# Patient Record
Sex: FEMALE | Race: WHITE | NOT HISPANIC OR LATINO
[De-identification: names, ages, dates, MRNs, and addresses within clinical notes are randomized per-mention and may not be internally consistent; named-entity substitution may affect disease eponyms.]

---

## 2019-07-09 ENCOUNTER — APPOINTMENT (OUTPATIENT)
Dept: NEUROSURGERY | Facility: HOSPITAL | Age: 39
End: 2019-07-09
Payer: COMMERCIAL

## 2019-07-09 ENCOUNTER — TRANSCRIPTION ENCOUNTER (OUTPATIENT)
Age: 39
End: 2019-07-09

## 2019-07-09 ENCOUNTER — INPATIENT (INPATIENT)
Facility: HOSPITAL | Age: 39
LOS: 0 days | Discharge: HOME | DRG: 27 | End: 2019-07-10
Attending: NEUROLOGICAL SURGERY | Admitting: NEUROLOGICAL SURGERY
Payer: COMMERCIAL

## 2019-07-09 VITALS
RESPIRATION RATE: 16 BRPM | OXYGEN SATURATION: 97 % | HEIGHT: 64 IN | TEMPERATURE: 98 F | SYSTOLIC BLOOD PRESSURE: 135 MMHG | WEIGHT: 130.07 LBS | HEART RATE: 97 BPM | DIASTOLIC BLOOD PRESSURE: 92 MMHG

## 2019-07-09 DIAGNOSIS — Z98.890 OTHER SPECIFIED POSTPROCEDURAL STATES: Chronic | ICD-10-CM

## 2019-07-09 DIAGNOSIS — I67.1 CEREBRAL ANEURYSM, NONRUPTURED: ICD-10-CM

## 2019-07-09 LAB
ANION GAP SERPL CALC-SCNC: 16 MMOL/L — SIGNIFICANT CHANGE UP (ref 5–17)
APTT BLD: 32.4 SEC — SIGNIFICANT CHANGE UP (ref 27.5–36.3)
BLD GP AB SCN SERPL QL: NEGATIVE — SIGNIFICANT CHANGE UP
BLD GP AB SCN SERPL QL: NEGATIVE — SIGNIFICANT CHANGE UP
BUN SERPL-MCNC: 6 MG/DL — LOW (ref 7–23)
CALCIUM SERPL-MCNC: 9 MG/DL — SIGNIFICANT CHANGE UP (ref 8.4–10.5)
CHLORIDE SERPL-SCNC: 100 MMOL/L — SIGNIFICANT CHANGE UP (ref 96–108)
CO2 SERPL-SCNC: 24 MMOL/L — SIGNIFICANT CHANGE UP (ref 22–31)
CREAT SERPL-MCNC: 0.56 MG/DL — SIGNIFICANT CHANGE UP (ref 0.5–1.3)
GLUCOSE SERPL-MCNC: 81 MG/DL — SIGNIFICANT CHANGE UP (ref 70–99)
HCG SERPL-ACNC: <2 MIU/ML — SIGNIFICANT CHANGE UP
HCT VFR BLD CALC: 38.8 % — SIGNIFICANT CHANGE UP (ref 34.5–45)
HGB BLD-MCNC: 13.9 G/DL — SIGNIFICANT CHANGE UP (ref 11.5–15.5)
INR BLD: 1 RATIO — SIGNIFICANT CHANGE UP (ref 0.88–1.16)
MCHC RBC-ENTMCNC: 34.8 PG — HIGH (ref 27–34)
MCHC RBC-ENTMCNC: 35.8 GM/DL — SIGNIFICANT CHANGE UP (ref 32–36)
MCV RBC AUTO: 97.2 FL — SIGNIFICANT CHANGE UP (ref 80–100)
PA ADP PRP-ACNC: 175 PRU — LOW (ref 194–417)
PLATELET # BLD AUTO: 244 K/UL — SIGNIFICANT CHANGE UP (ref 150–400)
POTASSIUM SERPL-MCNC: 3.9 MMOL/L — SIGNIFICANT CHANGE UP (ref 3.5–5.3)
POTASSIUM SERPL-SCNC: 3.9 MMOL/L — SIGNIFICANT CHANGE UP (ref 3.5–5.3)
PROTHROM AB SERPL-ACNC: 11.4 SEC — SIGNIFICANT CHANGE UP (ref 10–12.9)
RBC # BLD: 3.99 M/UL — SIGNIFICANT CHANGE UP (ref 3.8–5.2)
RBC # FLD: 12.4 % — SIGNIFICANT CHANGE UP (ref 10.3–14.5)
RH IG SCN BLD-IMP: POSITIVE — SIGNIFICANT CHANGE UP
SODIUM SERPL-SCNC: 140 MMOL/L — SIGNIFICANT CHANGE UP (ref 135–145)
WBC # BLD: 7.2 K/UL — SIGNIFICANT CHANGE UP (ref 3.8–10.5)
WBC # FLD AUTO: 7.2 K/UL — SIGNIFICANT CHANGE UP (ref 3.8–10.5)

## 2019-07-09 PROCEDURE — 99285 EMERGENCY DEPT VISIT HI MDM: CPT

## 2019-07-09 PROCEDURE — 61624 TCAT PERM OCCLS/EMBOLJ CNS: CPT

## 2019-07-09 PROCEDURE — 99291 CRITICAL CARE FIRST HOUR: CPT

## 2019-07-09 PROCEDURE — 36227 PLACE CATH XTRNL CAROTID: CPT | Mod: 50

## 2019-07-09 PROCEDURE — 36224 PLACE CATH CAROTD ART: CPT | Mod: 50

## 2019-07-09 PROCEDURE — 70496 CT ANGIOGRAPHY HEAD: CPT | Mod: 26

## 2019-07-09 PROCEDURE — 75898 FOLLOW-UP ANGIOGRAPHY: CPT | Mod: 26

## 2019-07-09 PROCEDURE — 75894 X-RAYS TRANSCATH THERAPY: CPT | Mod: 26

## 2019-07-09 PROCEDURE — 36226 PLACE CATH VERTEBRAL ART: CPT | Mod: 50

## 2019-07-09 PROCEDURE — 36228 PLACE CATH INTRACRANIAL ART: CPT | Mod: LT

## 2019-07-09 RX ORDER — ASPIRIN/CALCIUM CARB/MAGNESIUM 324 MG
325 TABLET ORAL DAILY
Refills: 0 | Status: DISCONTINUED | OUTPATIENT
Start: 2019-07-10 | End: 2019-07-10

## 2019-07-09 RX ORDER — CHLORHEXIDINE GLUCONATE 213 G/1000ML
1 SOLUTION TOPICAL
Refills: 0 | Status: DISCONTINUED | OUTPATIENT
Start: 2019-07-09 | End: 2019-07-10

## 2019-07-09 RX ORDER — ASPIRIN/CALCIUM CARB/MAGNESIUM 324 MG
650 TABLET ORAL ONCE
Refills: 0 | Status: COMPLETED | OUTPATIENT
Start: 2019-07-09 | End: 2019-07-09

## 2019-07-09 RX ORDER — CLOPIDOGREL BISULFATE 75 MG/1
600 TABLET, FILM COATED ORAL ONCE
Refills: 0 | Status: COMPLETED | OUTPATIENT
Start: 2019-07-09 | End: 2019-07-09

## 2019-07-09 RX ORDER — SENNA PLUS 8.6 MG/1
2 TABLET ORAL AT BEDTIME
Refills: 0 | Status: DISCONTINUED | OUTPATIENT
Start: 2019-07-09 | End: 2019-07-10

## 2019-07-09 RX ORDER — DEXTROSE MONOHYDRATE, SODIUM CHLORIDE, AND POTASSIUM CHLORIDE 50; .745; 4.5 G/1000ML; G/1000ML; G/1000ML
1000 INJECTION, SOLUTION INTRAVENOUS
Refills: 0 | Status: DISCONTINUED | OUTPATIENT
Start: 2019-07-09 | End: 2019-07-10

## 2019-07-09 RX ORDER — CLOPIDOGREL BISULFATE 75 MG/1
75 TABLET, FILM COATED ORAL DAILY
Refills: 0 | Status: DISCONTINUED | OUTPATIENT
Start: 2019-07-10 | End: 2019-07-10

## 2019-07-09 RX ORDER — DOCUSATE SODIUM 100 MG
100 CAPSULE ORAL THREE TIMES A DAY
Refills: 0 | Status: DISCONTINUED | OUTPATIENT
Start: 2019-07-09 | End: 2019-07-10

## 2019-07-09 RX ADMIN — DEXTROSE MONOHYDRATE, SODIUM CHLORIDE, AND POTASSIUM CHLORIDE 75 MILLILITER(S): 50; .745; 4.5 INJECTION, SOLUTION INTRAVENOUS at 18:13

## 2019-07-09 RX ADMIN — CLOPIDOGREL BISULFATE 600 MILLIGRAM(S): 75 TABLET, FILM COATED ORAL at 18:05

## 2019-07-09 RX ADMIN — CHLORHEXIDINE GLUCONATE 1 APPLICATION(S): 213 SOLUTION TOPICAL at 23:29

## 2019-07-09 RX ADMIN — Medication 650 MILLIGRAM(S): at 18:05

## 2019-07-09 NOTE — ED PROVIDER NOTE - ATTENDING CONTRIBUTION TO CARE
Jayne Rehman MD - Attending Physician: I have personally seen and examined this patient with the resident/fellow.  I have fully participated in the care of this patient. I have reviewed all pertinent clinical information, including history, physical exam, plan and the Resident/Fellow’s note and agree except as noted. See MDM

## 2019-07-09 NOTE — H&P ADULT - ASSESSMENT
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 Exam: Fully intact.  - Load 600mg plavix, 650mg ASA  - Plan for angio / pipeline tonight

## 2019-07-09 NOTE — CHART NOTE - NSCHARTNOTEFT_GEN_A_CORE
Interventional Neuro- Radiology   Procedure Note PA-C    Procedure: Selective Cerebral Angiography and PIpeline placement   Pre- Procedure Diagnosis:  Post- Procedure Diagnosis:    : Dr. Donal Nixon  Fellow:    Dr Lionel Garcia                        Physician Assistant: Sabrina Farah PA-C    Nurse:                    Tamela Murphy RN  Radiologic Tech:   Ryan Vega LRT  Anesthesiologist:  Dr Guille Brian   Sheath:                 4 Maldivian Fubuki     I/Os: EBL less than 10cc  IV fluids:     cc Urine output     cc  Contrast Omnipaque 240      cc         Antibiotics:    Vitals: BP         HR      Spo2       Preliminary Report:  Using a 4 Maldivian Fubuki sheath to the right groin under MAC sedation via   left vertebral artery,  left common carotid artery, left external carotid artery, right vertebral artery  right common carotid artery, right external carotid artery  a selective cerebral angiography was performed and  demonstrated                   Official note to follow).  Patient tolerated procedure well, hemodynamically stable, no change in neurological status compared to baseline.  Results discussed with neurosurgery, patient and patient's  family.  Right groin sheath was removed,  manual compression held to hemostasis  for  21 minutes, no active bleeding, no hematoma, quick clot and safeguard balloon dressing applied at Interventional Neuro- Radiology   Procedure Note PA-C    Procedure: Selective Cerebral Angiography and PIpeline placement   Pre- Procedure Diagnosis:  Post- Procedure Diagnosis:    : Dr. Donal Nixon  Fellow:    Dr Lionel Garcia                        Physician Assistant: Sabrina Farah PA-C    Nurse:                    Tamela Murphy RN  Radiologic Tech:    Matheus Jones LRT  Anesthesiologist:   Dr Guille Brian   Sheath:                 4 Palestinian Fubuki     I/Os: EBL less than 10cc  IV fluids:     cc Urine output     cc  Contrast Omnipaque 240      cc         Antibiotics:    Vitals: BP         HR      Spo2       Preliminary Report:  Using a 4 Palestinian Fubuki sheath to the right groin under MAC sedation via   left vertebral artery,  left common carotid artery, left external carotid artery, right vertebral artery  right common carotid artery, right external carotid artery  a selective cerebral angiography was performed and  demonstrated                   Official note to follow).  Patient tolerated procedure well, hemodynamically stable, no change in neurological status compared to baseline.  Results discussed with neurosurgery, patient and patient's  family.  Right groin sheath was removed,  manual compression held to hemostasis  for  21 minutes, no active bleeding, no hematoma, quick clot and safeguard balloon dressing applied at Interventional Neuro- Radiology   Procedure Note PA-C    Procedure: Selective Cerebral Angiography and PIpeline placement   Pre- Procedure Diagnosis:  Post- Procedure Diagnosis:    : Dr. Donal Nixon  Fellow:    Dr Lionel Garcia                        Physician Assistant: Sabrina Farah PA-C    Nurse:                    Tamela Murphy RN  Radiologic Tech:    Matheus Jones LRT  Anesthesiologist:   Dr Guille Brian   Sheath:                 4 Citizen of [place] Fubuki     I/Os: EBL less than 10cc  IV fluids:     cc Urine output     cc  Contrast Omnipaque 240             Antibiotics: Ancef 2 grams       Nicardipine 3mg left ICA    Vitals: BP         HR      Spo2       Preliminary Report:  Using a 4 Citizen of [place] Fubuki sheath to the right groin under general anesthesia via   left vertebral artery,  left common carotid artery, left external carotid artery, right vertebral artery  right common carotid artery, right external carotid artery  a selective cerebral angiography was performed and  demonstrated                   Official note to follow).  Patient tolerated procedure well, hemodynamically stable, no change in neurological status compared to baseline.  Results discussed with neurosurgery, patient and patient's  family.  Right groin sheath was removed,  manual compression held to hemostasis  for  21 minutes, no active bleeding, no hematoma, quick clot and safeguard balloon dressing applied at Interventional Neuro- Radiology   Procedure Note PA-C    Procedure: Selective Cerebral Angiography and PIpeline placement   Pre- Procedure Diagnosis:  Post- Procedure Diagnosis:    : Dr. Donal Nixon  Fellow:    Dr Lionel Garcia                        Physician Assistant: Sabrina Farah PA-C    Nurse:                    Tamela Murphy RN  Radiologic Tech:    Matheus Jones LRT  Anesthesiologist:   Dr Guille Brian   Sheath:                 4 Georgian Fubuki     I/Os: EBL less than 10cc  IV fluids:     cc Urine output     cc  Contrast Omnipaque 240      baseline            Antibiotics: Ancef 2 grams       Nicardipine 3mg left ICA    Heparin 3,000 units IV push    Vitals: BP         HR      Spo2       Preliminary Report:  Using a 4 Georgian Fubuki sheath to the right groin under general anesthesia via   left vertebral artery,  left common carotid artery, left external carotid artery, right vertebral artery  right common carotid artery, right external carotid artery  a selective cerebral angiography was performed and  demonstrated                   Official note to follow).  Patient tolerated procedure well, hemodynamically stable, no change in neurological status compared to baseline.  Results discussed with neurosurgery, patient and patient's  family.  Right groin sheath was removed,  manual compression held to hemostasis  for  21 minutes, no active bleeding, no hematoma, quick clot and safeguard balloon dressing applied at Interventional Neuro- Radiology   Procedure Note PA-C    Procedure: Selective Cerebral Angiography and PIpeline placement   Pre- Procedure Diagnosis:  Post- Procedure Diagnosis:    : Dr. Donal Nixon  Fellow:    Dr Lionel Garcia                        Physician Assistant: Sabrina Farah PA-C    Nurse:                    Tamela Murphy RN  Chelsie Beth RN  Radiologic Tech:    Matheus Jones LRT  Anesthesiologist:   Dr Guille Brian   Sheath:                 4 Armenian Fubuki     I/Os: EBL less than 10cc  IV fluids:     cc Urine output     cc  Contrast Omnipaque 240      baseline            Antibiotics: Ancef 2 grams       Nicardipine 3mg left ICA    Heparin 3,000 units IV push    Vitals: BP         HR      Spo2       Preliminary Report:  Using a 4 Armenian Fubuki sheath to the right groin under general anesthesia via left vertebral artery, left internal carotid artery, left external carotid artery, right vertebral artery, right internal carotid artery, right external carotid artery a selective cerebral angiography was performed and  demonstrated                   Official note to follow.  Patient tolerated procedure well, hemodynamically stable, no change in neurological status compared to baseline. Results discussed with neuro ICU team, patient and patient's . Right groin sheath was removed, manual compression held to hemostasis for 20 minutes, no active bleeding, no hematoma, quick clot and safeguard balloon dressing applied at Interventional Neuro- Radiology   Procedure Note PA-C    Procedure: Selective Cerebral Angiography and PIpeline placement X 2  Pre- Procedure Diagnosis: left ICA aneurysm   Post- Procedure Diagnosis:    : Dr. Donal Nixon  Fellow:    Dr Lionel Garcia                        Physician Assistant: Sabrina Farah PA-C    Nurse:                    Tamela Beth RN  Radiologic Tech:    Mahteus Jones LRT  Anesthesiologist:    Dr Guille Brian   Sheath:                  4 Cypriot Fubuki     I/Os: EBL less than 10cc  IV fluids:     cc Urine output     cc  Contrast Omnipaque 240      baseline     repeat ACT       Antibiotics: Ancef 2 grams       Nicardipine 3mg left ICA    Heparin 3,000 units IV push    Vitals: BP         HR      Spo2       Preliminary Report:  Using a 4 Cypriot Fubuki sheath to the right groin under general anesthesia via left vertebral artery, left internal carotid artery, left external carotid artery, right vertebral artery, right internal carotid artery, right external carotid artery a selective cerebral angiography was performed and  demonstrated                   Official note to follow.  Patient tolerated procedure well, hemodynamically stable, no change in neurological status compared to baseline. Results discussed with neuro ICU team, patient and patient's . Right groin sheath was removed, manual compression held to hemostasis for 20 minutes, no active bleeding, no hematoma, quick clot and safeguard balloon dressing applied at Interventional Neuro- Radiology   Procedure Note PA-C    Procedure: Selective Cerebral Angiography and PIpeline placement X 2  Pre- Procedure Diagnosis: left ICA aneurysm   Post- Procedure Diagnosis:    : Dr. Donal Nixon  Fellow:    Dr Lionel Garcia                        Physician Assistant: Sabrina Farah PA-C    Nurse:                    Tamela Beth RN  Radiologic Tech:    Matheus Jones LRT  Anesthesiologist:    Dr Guille Brian   Sheath:                  4 Jordanian Fubuki     I/Os: EBL less than 10cc  IV fluids:     cc Urine output     cc  Contrast Omnipaque 240      baseline     repeat     Integrelin 7cc        Antibiotics: Ancef 2 grams       Nicardipine 3mg left ICA    Heparin 3,000 units IV push    Vitals: BP 91/59   HR  63    Spo2 98%       Preliminary Report:  Using a 4 Jordanian Fubuki sheath to the right groin under general anesthesia via left vertebral artery, left internal carotid artery, left external carotid artery, right vertebral artery, right internal carotid artery, right external carotid artery a selective cerebral angiography was performed and  demonstrated                   Official note to follow.  Patient tolerated procedure well, hemodynamically stable, no change in neurological status compared to baseline. Results discussed with neuro ICU team, patient and patient's . Right groin sheath was removed, manual compression held to hemostasis for 20 minutes, no active bleeding, no hematoma, quick clot and safeguard balloon dressing applied at Interventional Neuro- Radiology   Procedure Note PA-C    Procedure: Selective Cerebral Angiography and PIpeline placement X 2  Pre- Procedure Diagnosis: left ICA aneurysm   Post- Procedure Diagnosis:    : Dr. Donal Nixon  Fellow:    Dr Lionel Garcia                        Physician Assistant: Sabrina Farah PA-C    Nurse:                    Tamela Beth RN  Radiologic Tech:    Matheus Jones LRT  Anesthesiologist:    Dr Guille Gimenez   Sheath:                  4 [place] Leonean Fubuki     I/Os: EBL less than 10cc  IV fluids:     cc Urine output     cc  Contrast Omnipaque 240      baseline     repeat     Integrelin 7cc        Antibiotics: Ancef 2 grams       Nicardipine 3mg left ICA    Heparin 3,000 units IV push    Vitals: BP 91/59   HR  63    Spo2 98%       Preliminary Report:  Using a 4 [place] Leonean Fubuki sheath to the right groin under general anesthesia via left vertebral artery, left internal carotid artery, left external carotid artery, right vertebral artery, right internal carotid artery, right external carotid artery a selective cerebral angiography was performed and  demonstrated                   Official note to follow.  Patient tolerated procedure well, hemodynamically stable, no change in neurological status compared to baseline. Results discussed with neuro ICU team, patient and patient's . Right groin sheath was removed, manual compression held to hemostasis for 20 minutes, no active bleeding, no hematoma, quick clot and safeguard balloon dressing applied at Interventional Neuro- Radiology   Procedure Note PA-C    Procedure: Selective Cerebral Angiography and PIpeline placement X 2  Pre- Procedure Diagnosis: left ICA aneurysm   Post- Procedure Diagnosis:    : Dr. Donal Nixon  Fellow:    Dr Lionel Garcia                        Physician Assistant: IJEOMA Nowak-C    Nurse:                    Tamela Beth RN  Radiologic Tech:    Matheus Jones LRT  Anesthesiologist:    Dr Guille Gimenez   Sheath:                  4 [place]orese Fubuki     I/Os: EBL less than 10cc  IV fluids:     cc Urine output     cc  Contrast Omnipaque 240      baseline     repeat     Integrelin 7cc     !st pipeline stent 4.25mm by 12mm     2nd pipeline stent 4.0mm by 10mm       Antibiotics: Ancef 2 grams       Nicardipine 3mg left ICA    Heparin 3,000 units IV push    Vitals: BP 91/59   HR  63    Spo2 98%       Preliminary Report:  Using a 4 [place]orese Fubuki sheath to the right groin under general anesthesia via left vertebral artery, left internal carotid artery, left external carotid artery, right vertebral artery, right internal carotid artery, right external carotid artery a selective cerebral angiography was performed and  demonstrated                   Official note to follow.  Patient tolerated procedure well, hemodynamically stable, no change in neurological status compared to baseline. Results discussed with neuro ICU team, patient and patient's . Right groin sheath was removed, manual compression held to hemostasis for 20 minutes, no active bleeding, no hematoma, quick clot and safeguard balloon dressing applied at Interventional Neuro- Radiology   Procedure Note PA-C    Procedure: Selective Cerebral Angiography and PIpeline placement X 2  Pre- Procedure Diagnosis: left ICA aneurysm   Post- Procedure Diagnosis: complete occlusion of left ICA aneurysm     : Dr. Donal Nixon  Fellow:    Dr Lionel Garcia                        Physician Assistant: Sabrina Farah, PA-C    Nurse:                    Tamela Murphy RN  Chelsie Beth RN  Radiologic Tech:    Matheus Jones LRT  Anesthesiologist:    Dr Guille Gimenez   Sheath:                  4 Nigerian Fub[employer]     I/Os: EBL less than 10cc  IV fluids:500cc Urine output 100 cc  Contrast Omnipaque 240 155cc     baseline     2nd         Integrelin 10cc     !st pipeline stent 4.25mm by 12mm     2nd pipeline stent 4.0mm by 10mm       Antibiotics: Ancef 2 grams       Nicardipine 3mg left ICA    Heparin 3,000 units IV push    Vitals: BP 91/59   HR  63    Spo2 98%       Preliminary Report:  Using a 4 Nigerian Fubuki sheath to the right groin under general anesthesia via left vertebral artery, left internal carotid artery, left external carotid artery, right vertebral artery, right internal carotid artery, right external carotid artery a selective cerebral angiography was performed and  demonstrated a left ICA aneurysm. Patient underwent successful endovascular treatment with 2 pipeline stents. Official note to follow.  Patient tolerated procedure well, hemodynamically stable, no change in neurological status compared to baseline. Results discussed with neuro ICU team, patient and patient's . Right groin sheath was removed, manual compression held to hemostasis for 20 minutes, no active bleeding, no hematoma, quick clot and safeguard balloon dressing applied at 2100 hours.

## 2019-07-09 NOTE — ED ADULT NURSE NOTE - NSIMPLEMENTINTERV_GEN_ALL_ED
Implemented All Universal Safety Interventions:  [place] to call system. Call bell, personal items and telephone within reach. Instruct patient to call for assistance. Room bathroom lighting operational. Non-slip footwear when patient is off stretcher. Physically safe environment: no spills, clutter or unnecessary equipment. Stretcher in lowest position, wheels locked, appropriate side rails in place.
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
## 2019-07-09 NOTE — ED PROVIDER NOTE - PHYSICAL EXAMINATION
General: Alert and Oriented. No apparent distress.  Head: Normocephalic and atraumatic.  Eyes: PERRLA with EOMI.  Neck: Supple. Trachea midline.   Cardiac: Normal S1 and S2 w/ RRR. No murmurs appreciated.   Pulmonary: Vesicular breath sounds bilaterally. No increased WOB. No wheezes or crackles.  Abdominal: Soft, non-tender. Normoactive bowel sounds.  Neurologic: A&O x3. CN 2-12 intact. Strength 5/5 throughout b/l UE and LE. Sensation intact to light touch throughout b/l UE and LE. Finger to nose intact. Gait wnl.   Musculoskeletal: Strength appropriate in all 4 extremities for age with no limited ROM.  Skin: Color appropriate for race. Intact, warm, and well-perfused.

## 2019-07-09 NOTE — ED PROVIDER NOTE - CHPI ED SYMPTOMS NEG
no numbness/no vomiting/no CP, no SOB, no visual changes, no lightheadedness, no dizziness, no tingling/no weakness/no nausea

## 2019-07-09 NOTE — CHART NOTE - NSCHARTNOTEFT_GEN_A_CORE
Interventional Neuro Radiology  Pre-Procedure Note PA-C    This is a 39 year old right hand dominant female            Allergies: No Known Allergies  PMHX:   PSHX:  Social History:   FAMILY HISTORY:    Current Medications: aspirin 650 milliGRAM(s) Oral once  clopidogrel Tablet 600 milliGRAM(s) Oral Once  sodium chloride 0.9% with potassium chloride 20 mEq/L 1000 milliLiter(s) IV                         Blood Bank:       Assessment/Plan:   This is a 39 year old right hand dominant female with a left ICA aneurysm for selective cerebral angiography and endovascular treatment of aneurysm. Procedure, goals, risks, benefits and alternatives were discussed with patient and patient's family. All questions were answered. Risks include but are not limited to stroke, vessel injury, hemorrhage, and or right groin hematoma. Patient demonstrates understanding of all risks involved with this procedure and wishes to continue. Appropriate content was obtained from patient and consent is in the patient's chart. Interventional Neuro Radiology  Pre-Procedure Note PA-C    This is a 39 year old right hand dominant female with a past medical history significant for ovarian cyst removal, with complaints of headaches and right ear fullness, with outside imaging which revealed a left ICA aneurysm. Patient is transported to Neuro IR for a selective cerebral angiography and possible embolization of aneurysm.     Allergies: No Known Allergies  PMHX: ovarian cyst   PSHX: ovarian cyst removal   Social History: , +tobacco   FAMILY HISTORY: non-contributory     Current Medications: aspirin 650 milliGRAM(s) Oral once  clopidogrel Tablet 600 milliGRAM(s) Oral Once  sodium chloride 0.9% with potassium chloride 20 mEq/L 1000 milliLiter(s) IV     Complete Blood Count (07.09.19 )    WBC Count: 7.2 K/uL    Hemoglobin: 13.9 g/dL    Hematocrit: 38.8 %    Platelet Count - Automated: 244 K/uL    Basic Metabolic Panel - STAT (07.09.19 )    Sodium, Serum: 140 mmol/L    Potassium, Serum: 3.9 mmol/L    Chloride, Serum: 100 mmol/L    Carbon Dioxide, Serum: 24 mmol/L    Anion Gap, Serum: 16 mmol/L    Blood Urea Nitrogen, Serum: 6 mg/dL    Creatinine, Serum: 0.56 mg/dL    Glucose, Serum: 81 mg/dL    Blood Bank:  A positive available (07.09.19 )    Assessment/Plan:   This is a 39 year old right hand dominant female with a left ICA aneurysm for selective cerebral angiography and endovascular treatment of aneurysm. Procedure, goals, risks, benefits and alternatives were discussed with patient and patient's family. All questions were answered. Risks include but are not limited to stroke, vessel injury, hemorrhage, and or right groin hematoma. Patient demonstrates understanding of all risks involved with this procedure and wishes to continue. Appropriate content was obtained from patient and consent is in the patient's chart.
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
## 2019-07-10 ENCOUNTER — TRANSCRIPTION ENCOUNTER (OUTPATIENT)
Age: 39
End: 2019-07-10

## 2019-07-10 VITALS
TEMPERATURE: 98 F | SYSTOLIC BLOOD PRESSURE: 101 MMHG | OXYGEN SATURATION: 99 % | RESPIRATION RATE: 21 BRPM | DIASTOLIC BLOOD PRESSURE: 66 MMHG | HEART RATE: 62 BPM

## 2019-07-10 PROBLEM — Z00.00 ENCOUNTER FOR PREVENTIVE HEALTH EXAMINATION: Status: ACTIVE | Noted: 2019-07-10

## 2019-07-10 LAB
ANION GAP SERPL CALC-SCNC: 11 MMOL/L — SIGNIFICANT CHANGE UP (ref 5–17)
BUN SERPL-MCNC: 7 MG/DL — SIGNIFICANT CHANGE UP (ref 7–23)
CALCIUM SERPL-MCNC: 7.8 MG/DL — LOW (ref 8.4–10.5)
CHLORIDE SERPL-SCNC: 102 MMOL/L — SIGNIFICANT CHANGE UP (ref 96–108)
CO2 SERPL-SCNC: 23 MMOL/L — SIGNIFICANT CHANGE UP (ref 22–31)
CREAT SERPL-MCNC: 0.62 MG/DL — SIGNIFICANT CHANGE UP (ref 0.5–1.3)
GLUCOSE SERPL-MCNC: 86 MG/DL — SIGNIFICANT CHANGE UP (ref 70–99)
HCT VFR BLD CALC: 33.5 % — LOW (ref 34.5–45)
HGB BLD-MCNC: 12.1 G/DL — SIGNIFICANT CHANGE UP (ref 11.5–15.5)
MAGNESIUM SERPL-MCNC: 1.7 MG/DL — SIGNIFICANT CHANGE UP (ref 1.6–2.6)
MCHC RBC-ENTMCNC: 34.4 PG — HIGH (ref 27–34)
MCHC RBC-ENTMCNC: 35.9 GM/DL — SIGNIFICANT CHANGE UP (ref 32–36)
MCV RBC AUTO: 95.6 FL — SIGNIFICANT CHANGE UP (ref 80–100)
PA ADP PRP-ACNC: 78 PRU — LOW (ref 194–417)
PA ADP PRP-ACNC: 88 PRU — LOW (ref 194–417)
PHOSPHATE SERPL-MCNC: 4.1 MG/DL — SIGNIFICANT CHANGE UP (ref 2.5–4.5)
PLATELET # BLD AUTO: 243 K/UL — SIGNIFICANT CHANGE UP (ref 150–400)
POTASSIUM SERPL-MCNC: 3.5 MMOL/L — SIGNIFICANT CHANGE UP (ref 3.5–5.3)
POTASSIUM SERPL-SCNC: 3.5 MMOL/L — SIGNIFICANT CHANGE UP (ref 3.5–5.3)
RBC # BLD: 3.51 M/UL — LOW (ref 3.8–5.2)
RBC # FLD: 12.3 % — SIGNIFICANT CHANGE UP (ref 10.3–14.5)
SODIUM SERPL-SCNC: 136 MMOL/L — SIGNIFICANT CHANGE UP (ref 135–145)
WBC # BLD: 7.6 K/UL — SIGNIFICANT CHANGE UP (ref 3.8–10.5)
WBC # FLD AUTO: 7.6 K/UL — SIGNIFICANT CHANGE UP (ref 3.8–10.5)

## 2019-07-10 PROCEDURE — C1876: CPT

## 2019-07-10 PROCEDURE — 80061 LIPID PANEL: CPT

## 2019-07-10 PROCEDURE — 97161 PT EVAL LOW COMPLEX 20 MIN: CPT

## 2019-07-10 PROCEDURE — 36227 PLACE CATH XTRNL CAROTID: CPT

## 2019-07-10 PROCEDURE — 36224 PLACE CATH CAROTD ART: CPT

## 2019-07-10 PROCEDURE — C1894: CPT

## 2019-07-10 PROCEDURE — 83036 HEMOGLOBIN GLYCOSYLATED A1C: CPT

## 2019-07-10 PROCEDURE — 85610 PROTHROMBIN TIME: CPT

## 2019-07-10 PROCEDURE — 85027 COMPLETE CBC AUTOMATED: CPT

## 2019-07-10 PROCEDURE — 84702 CHORIONIC GONADOTROPIN TEST: CPT

## 2019-07-10 PROCEDURE — C1760: CPT

## 2019-07-10 PROCEDURE — 97165 OT EVAL LOW COMPLEX 30 MIN: CPT

## 2019-07-10 PROCEDURE — 76380 CAT SCAN FOLLOW-UP STUDY: CPT

## 2019-07-10 PROCEDURE — 36228 PLACE CATH INTRACRANIAL ART: CPT

## 2019-07-10 PROCEDURE — 84100 ASSAY OF PHOSPHORUS: CPT

## 2019-07-10 PROCEDURE — 70544 MR ANGIOGRAPHY HEAD W/O DYE: CPT | Mod: 26,59

## 2019-07-10 PROCEDURE — 83735 ASSAY OF MAGNESIUM: CPT

## 2019-07-10 PROCEDURE — 86850 RBC ANTIBODY SCREEN: CPT

## 2019-07-10 PROCEDURE — 86900 BLOOD TYPING SEROLOGIC ABO: CPT

## 2019-07-10 PROCEDURE — 61624 TCAT PERM OCCLS/EMBOLJ CNS: CPT

## 2019-07-10 PROCEDURE — 86901 BLOOD TYPING SEROLOGIC RH(D): CPT

## 2019-07-10 PROCEDURE — 85576 BLOOD PLATELET AGGREGATION: CPT

## 2019-07-10 PROCEDURE — 70553 MRI BRAIN STEM W/O & W/DYE: CPT

## 2019-07-10 PROCEDURE — C1887: CPT

## 2019-07-10 PROCEDURE — 70546 MR ANGIOGRAPH HEAD W/O&W/DYE: CPT

## 2019-07-10 PROCEDURE — 80048 BASIC METABOLIC PNL TOTAL CA: CPT

## 2019-07-10 PROCEDURE — 99285 EMERGENCY DEPT VISIT HI MDM: CPT

## 2019-07-10 PROCEDURE — 70551 MRI BRAIN STEM W/O DYE: CPT | Mod: 26

## 2019-07-10 PROCEDURE — 85730 THROMBOPLASTIN TIME PARTIAL: CPT

## 2019-07-10 PROCEDURE — 75894 X-RAYS TRANSCATH THERAPY: CPT

## 2019-07-10 PROCEDURE — 36226 PLACE CATH VERTEBRAL ART: CPT

## 2019-07-10 PROCEDURE — 75898 FOLLOW-UP ANGIOGRAPHY: CPT

## 2019-07-10 PROCEDURE — C1889: CPT

## 2019-07-10 PROCEDURE — C1769: CPT

## 2019-07-10 RX ORDER — SODIUM CHLORIDE 9 MG/ML
250 INJECTION INTRAMUSCULAR; INTRAVENOUS; SUBCUTANEOUS ONCE
Refills: 0 | Status: COMPLETED | OUTPATIENT
Start: 2019-07-10 | End: 2019-07-10

## 2019-07-10 RX ORDER — ASPIRIN/CALCIUM CARB/MAGNESIUM 324 MG
325 TABLET ORAL DAILY
Refills: 0 | Status: DISCONTINUED | OUTPATIENT
Start: 2019-07-10 | End: 2019-07-10

## 2019-07-10 RX ORDER — CLOPIDOGREL BISULFATE 75 MG/1
75 TABLET, FILM COATED ORAL DAILY
Refills: 0 | Status: DISCONTINUED | OUTPATIENT
Start: 2019-07-10 | End: 2019-07-10

## 2019-07-10 RX ORDER — CHLORHEXIDINE GLUCONATE 213 G/1000ML
1 SOLUTION TOPICAL
Refills: 0 | Status: DISCONTINUED | OUTPATIENT
Start: 2019-07-10 | End: 2019-07-10

## 2019-07-10 RX ORDER — CLOPIDOGREL BISULFATE 75 MG/1
1 TABLET, FILM COATED ORAL
Qty: 30 | Refills: 0
Start: 2019-07-10 | End: 2019-08-08

## 2019-07-10 RX ORDER — ASPIRIN/CALCIUM CARB/MAGNESIUM 324 MG
1 TABLET ORAL
Qty: 30 | Refills: 0
Start: 2019-07-10 | End: 2019-08-08

## 2019-07-10 RX ORDER — ACETAMINOPHEN 500 MG
650 TABLET ORAL EVERY 6 HOURS
Refills: 0 | Status: DISCONTINUED | OUTPATIENT
Start: 2019-07-10 | End: 2019-07-10

## 2019-07-10 RX ORDER — ACETAMINOPHEN 500 MG
2 TABLET ORAL
Qty: 0 | Refills: 0 | DISCHARGE
Start: 2019-07-10

## 2019-07-10 RX ADMIN — CLOPIDOGREL BISULFATE 75 MILLIGRAM(S): 75 TABLET, FILM COATED ORAL at 11:30

## 2019-07-10 RX ADMIN — Medication 650 MILLIGRAM(S): at 08:20

## 2019-07-10 RX ADMIN — Medication 325 MILLIGRAM(S): at 11:30

## 2019-07-10 RX ADMIN — DEXTROSE MONOHYDRATE, SODIUM CHLORIDE, AND POTASSIUM CHLORIDE 75 MILLILITER(S): 50; .745; 4.5 INJECTION, SOLUTION INTRAVENOUS at 07:00

## 2019-07-10 RX ADMIN — Medication 650 MILLIGRAM(S): at 08:50

## 2019-07-10 RX ADMIN — Medication 100 MILLIGRAM(S): at 13:58

## 2019-07-10 RX ADMIN — DEXTROSE MONOHYDRATE, SODIUM CHLORIDE, AND POTASSIUM CHLORIDE 75 MILLILITER(S): 50; .745; 4.5 INJECTION, SOLUTION INTRAVENOUS at 09:06

## 2019-07-10 RX ADMIN — SODIUM CHLORIDE 1000 MILLILITER(S): 9 INJECTION INTRAMUSCULAR; INTRAVENOUS; SUBCUTANEOUS at 03:00
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
## 2019-07-10 NOTE — CHART NOTE - NSCHARTNOTEFT_GEN_A_CORE
CAPRINI SCORE [CLOT] Score on Admission for     AGE RELATED RISK FACTORS                                                       MOBILITY RELATED FACTORS  [x] Age 41-60 years                                            (1 Point)                  [ ] Bed rest                                                        (1 Point)  [ ] Age: 61-74 years                                           (2 Points)                 [ ] Plaster cast                                                   (2 Points)  [ ] Age= 75 years                                              (3 Points)                 [ ] Bed bound for more than 72 hours                 (2 Points)    DISEASE RELATED RISK FACTORS                                               GENDER SPECIFIC FACTORS  [ ] Edema in the lower extremities                       (1 Point)                  [ ] Pregnancy                                                     (1 Point)  [ ] Varicose veins                                               (1 Point)                  [ ] Post-partum < 6 weeks                                   (1 Point)             [ ] BMI > 25 Kg/m2                                            (1 Point)                  [ ] Hormonal therapy  or oral contraception          (1 Point)                 [ ] Sepsis (in the previous month)                        (1 Point)                  [ ] History of pregnancy complications                 (1 point)  [ ] Pneumonia or serious lung disease                                               [ ] Unexplained or recurrent                     (1 Point)           (in the previous month)                               (1 Point)  [ ] Abnormal pulmonary function test                     (1 Point)                 SURGERY RELATED RISK FACTORS (include planned surgeries)  [ ] Acute myocardial infarction                              (1 Point)                 [ ]  Section                                             (1 Point)  [ ] Congestive heart failure (in the previous month)  (1 Point)         [ ] Minor surgery                                                  (1 Point)   [ ] Inflammatory bowel disease                             (1 Point)                 [ ] Arthroscopic surgery                                        (2 Points)  [ ] Central venous access                                      (2 Points)                [ ] General surgery lasting more than 45 minutes   (2 Points)       [ ] Stroke (in the previous month)                          (5 Points)               [ ] Elective arthroplasty                                         (5 Points)            [ ] current or past malignancy                              (2 Points)                                                                                                       HEMATOLOGY RELATED FACTORS                                                 TRAUMA RELATED RISK FACTORS  [ ] Prior episodes of VTE                                     (3 Points)                [ ] Fracture of the hip, pelvis, or leg                       (5 Points)  [ ] Positive family history for VTE                         (3 Points)                 [ ] Acute spinal cord injury (in the previous month)  (5 Points)  [ ] Prothrombin 35458 A                                     (3 Points)                 [ ] Paralysis  (less than 1 month)                             (5 Points)  [ ] Factor V Leiden                                             (3 Points)                  [ ] Multiple Trauma within 1 month                        (5 Points)  [ ] Lupus anticoagulants                                     (3 Points)                                                           [ ] Anticardiolipin antibodies                               (3 Points)                                                       [ ] High homocysteine in the blood                      (3 Points)                                             [ ] Other congenital or acquired thrombophilia      (3 Points)                                                [ ] Heparin induced thrombocytopenia                  (3 Points)                                          Total Score [       1  ]    Risk:  Very low 0   Low 1 to 2   Moderate 3 to 4   High =5       VTE Prophylasix Recommednations:  [\x] mechanical pneumatic compression devices                                      [ ] contraindicated: _____________________  [x] chemo prophylasix                                                                                   [ ] contraindicated _____________________    **** HIGH LIKELIHOOD DVT PRESENT ON ADMISSION  [ ] (please order LE dopplers within 24 hours of admission)
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
## 2019-07-10 NOTE — DISCHARGE NOTE PROVIDER - REASON FOR ADMISSION
Elective pipeline stent placement for unruptured left superior hypophyseal aneurysm on 7/9/19 Outside referral Cerebral Angiogram with stent placement for unruptured cerebral aneurysm 7/9/19 cerebral angiogram with two pipeline stent placement of a left superior hypophyseal aneurysm

## 2019-07-10 NOTE — OCCUPATIONAL THERAPY INITIAL EVALUATION ADULT - LIVES WITH, PROFILE
Pt lives in a pvt home with spouse and twin 8 yo, 4 steps to enter, 6 steps inside. +Stall. No DME/AD/spouse/children

## 2019-07-10 NOTE — OCCUPATIONAL THERAPY INITIAL EVALUATION ADULT - DIAGNOSIS, OT EVAL
Patient with deficits in ADL status and functional mobility due to impaired balance, strength, ROM, coordination

## 2019-07-10 NOTE — OCCUPATIONAL THERAPY INITIAL EVALUATION ADULT - GENERAL OBSERVATIONS, REHAB EVAL
1.  Ocular HTN OU:  Elevated intraocular pressure in past (Tmax 20 mmHg OD 21 OSmmHG) without signs of glaucomatous damage to the optic nerve. Normal OCT today. Will continue to monitor. 2. PVD OU:  Patient was cautioned to call our office immediately if they experience a substantial change in their symptoms such as an increase in floaters persistent flashes loss of visual field (may appear as a shadow or a curtain) or decrease in visual acuity as these may indicate a retinal tear or detachment. If this is a new problem patient will need to return for re-examination  as determined by the 2050 [employer]. Refractive error - Change glasses. 4.  Nuclear Sclerotic Cataract OU: Explained how cataracts can effect vision. Recommend clinical observation. The patient was advised to contact us if any change or worsening of vision. 5. Return for an appointment in 3 months for cataract evaluation. with Dr. Boo Martini. Semi-supine, +IVL, tele, pulse ox

## 2019-07-10 NOTE — DISCHARGE NOTE PROVIDER - NSDCCPTREATMENT_GEN_ALL_CORE_FT
PRINCIPAL PROCEDURE  Procedure: Angiogram, cerebral, with stent insertion  Findings and Treatment: Please make an appointment for follow up with neurosurgeon Dr. Trevino in 1-2 weeks. [phone]  NO heavy lifting, strenuous activity, twisting, bending, driving, or working until cleared by your physician.  Return to ER immediately for any of the following: fever, bleeding, new onset numbness/tingling/weakness, nausea and/or vomiting, chest pain, shortness of breath, confusion, seizure, altered mental status, urinary and/or fecal incontinence or retention.  Please make an appointment for follow up with your primary care physician after discharge. PRINCIPAL PROCEDURE  Procedure: Angiogram, cerebral, with stent insertion  Findings and Treatment: Please follow up with Dr. Trevino in 7/24/19 at 11:30 am with Astrid. To make any changes, please call [phone].   NO heavy lifting, strenuous activity, twisting, bending, driving, or working until cleared by your physician.  Return to ER immediately for any of the following: fever, bleeding, new onset numbness/tingling/weakness, nausea and/or vomiting, chest pain, shortness of breath, confusion, seizure, altered mental status, urinary and/or fecal incontinence or retention.  Please make an appointment for follow up with your primary care physician after discharge.

## 2019-07-10 NOTE — DISCHARGE NOTE NURSING/CASE MANAGEMENT/SOCIAL WORK - NSDCPEEMAIL_GEN_ALL_CORE
[facility] for Tobacco Control email [email or web address]

## 2019-07-10 NOTE — DISCHARGE NOTE NURSING/CASE MANAGEMENT/SOCIAL WORK - NSDCPEWEB_GEN_ALL_CORE
[facility] for Tobacco Control website --- [email or web address] website --- [email or web address]

## 2019-07-10 NOTE — PHYSICAL THERAPY INITIAL EVALUATION ADULT - GENERAL OBSERVATIONS, REHAB EVAL
Pt rec'd semi-supine in bed in NAD, VSS, +tele monitoring, +pulse ox, +spoue at b/s, agreeable to PT session
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
## 2019-07-10 NOTE — DISCHARGE NOTE PROVIDER - CARE PROVIDER_API CALL
Donal Trevino)  Neurological Surgery  [address] [place] [address]  Phone: [phone]  Fax: [phone]  Follow Up Time:

## 2019-07-10 NOTE — DISCHARGE NOTE PROVIDER - NSDCCPCAREPLAN_GEN_ALL_CORE_FT
PRINCIPAL DISCHARGE DIAGNOSIS  Diagnosis: Cerebral aneurysm, nonruptured  Assessment and Plan of Treatment: S/P pipeline stent x2 of left hypophyseal aneurysm  Continue taking Aspirin 325 mg daily and Plavix 75 mg daily

## 2019-07-10 NOTE — DISCHARGE NOTE PROVIDER - NSDCACTIVITY_GEN_ALL_CORE
Walking - Outdoors allowed/Showering allowed/Do not drive or operate machinery/No heavy lifting/straining/Stairs allowed/Walking - Indoors allowed

## 2019-07-10 NOTE — DISCHARGE NOTE NURSING/CASE MANAGEMENT/SOCIAL WORK - NSDCPEPTSTRK_GEN_ALL_CORE
Prescribed medications/Call 911 for stroke/Risk factors for stroke/Stroke support groups for patients, families, and friends/Stroke warning signs and symptoms/Signs and symptoms of stroke/Need for follow up after discharge/Stroke education booklet

## 2019-07-10 NOTE — PHYSICAL THERAPY INITIAL EVALUATION ADULT - PRECAUTIONS/LIMITATIONS, REHAB EVAL
was referred to the [facility] Emergency Department by her neurologist on 7/9/19. Pt now s /p  Pipeline stenting of a left superior hypophyseal artery aneurysm on 7/9

## 2019-07-10 NOTE — PHYSICAL THERAPY INITIAL EVALUATION ADULT - ADDITIONAL COMMENTS
Pt lives in a  with her spouse and children +4 steps to enter. Was ambulating independently without use of an AD prior and was independent with all ADLS. Currently works as an

## 2019-07-10 NOTE — PROGRESS NOTE ADULT - ASSESSMENT
PLAN  P2Y12 @midnight  GEOFF [employer] safeguard
ASSESSMENT/PLAN: Incidentally found cerebral aneurysm, post-operative day #1 from Pipeline stenting    Neuro  - ASA, clopidogrel- P2Y12- 88  - MR NOVA today  - PT/OT eval  - Pain control    CV  - Normotension  - SBP- 110- <140    Renal-   D/C ha    Endo  - Check HGBA1C and Lipid profile    ID   - monitor for fever    DVT prophylaxsis- SCD and chemopropylaxsis    Duisp - home if MRA stable     Time  spent - 30 min          Pt is not critically ill yet medically complex      Updated patient and  at bedside    D/W Dr. Trevino.
ASSESSMENT/PLAN: Incidentally found cerebral aneurysm, post-operative day 0 from Pipeline stenting  - ASA, clopidogrel  - P2Y12 tomorrow  - MR NOVA  - Normotension  - Pain control  - Post-angio vascular checks per protocol    Updated patient and  at bedside    D/W Dr. Trevino.

## 2019-07-24 ENCOUNTER — APPOINTMENT (OUTPATIENT)
Dept: NEUROSURGERY | Facility: CLINIC | Age: 39
End: 2019-07-24
Payer: COMMERCIAL

## 2019-07-24 VITALS
WEIGHT: 137 LBS | HEART RATE: 60 BPM | BODY MASS INDEX: 23.39 KG/M2 | RESPIRATION RATE: 18 BRPM | SYSTOLIC BLOOD PRESSURE: 115 MMHG | HEIGHT: 64 IN | TEMPERATURE: 97.8 F | DIASTOLIC BLOOD PRESSURE: 77 MMHG | OXYGEN SATURATION: 97 %

## 2019-07-24 DIAGNOSIS — Z72.0 TOBACCO USE: ICD-10-CM

## 2019-07-24 DIAGNOSIS — Z87.42 PERSONAL HISTORY OF OTHER DISEASES OF THE FEMALE GENITAL TRACT: ICD-10-CM

## 2019-07-24 PROCEDURE — 99214 OFFICE O/P EST MOD 30 MIN: CPT

## 2019-07-24 RX ORDER — ASPIRIN 325 MG/1
325 TABLET, FILM COATED ORAL
Refills: 0 | Status: ACTIVE | COMMUNITY

## 2019-07-24 NOTE — RESULTS/DATA
[FreeTextEntry1] : EXAM: MR BRAIN WAW IC \par \par EXAM: MR ANGIO BRAIN WAW IC \par \par \par PROCEDURE DATE: 07/10/2019 \par \par \par \par \par INTERPRETATION: \par CLINICAL INDICATION: Left ophthalmic level aneurysm post pipeline stent \par \par \par Magnetic resonance imaging of the brain was carried out with transaxial \par SPGR, FLAIR, fast spin echo T2 weighted images, axial susceptibility \par weighted series, diffusion weighted series and sagittal T1 weighted series \par on a 1.5 Yesi magnet. \par \par Comparison is made with the prior conventional angiogram 7/9/2019 and \par outside MRI 7/3/2019. \par \par The fourth, third and lateral ventricles are normal in size and position. \par There is no hemorrhage, mass or shift of the midline structures. No abnormal \par signal intensity is identified within the brain parenchyma on the T1, T2 or \par FLAIR sequences. There is no acute hemorrhage. There are 2 tiny foci of \par diffusion restriction in the left frontal subcortical region. \par \par Signal dropout overlying the left clinoid internal carotid artery is \par identified related to the presence of a pipeline stent. A small rounded \par focus of bright signal intensity in T2-weighted images in the anterior \par paraclinoid region is likely related to stagnant flow in the left ophthalmic \par artery. This measures 5.8 mm in AP diameter x 7.5 mm transversely \par \par \par A 3-D axial noncontrast MRA were performed on the cervical and intracranial \par vessels, respectively. Intravascular flow quantification was performed using \par gated 2D phase contrast MR, imaged perpendicular to the vessel axis. Images \par were post processed NOVA software and a NOVA flow study report is available. \par \par Signal dropout is identified overlying the region of the supraclinoid \par internal carotid artery from the pipeline stent. There is good intracranial \par flow. \par \par \par Flow is as follows in milliliters/min. \par \par TEJ 2954, RMCA 158, RACA 107, RACA2 50. \par \par LICA 286, LMCA 185, LACA 84, LACA2 84. \par \par , , , RPCA 96, LPCA 89. \par \par Impression: Pipeline stent for left supraclinoid internal carotid artery \par with stagnant flow in the left ophthalmic artery aneurysm. Good intracranial \par flow. 2 tiny foci of diffusion restriction in the left frontal subcortical \par white matter. \par \par \par \par \par \par \par \par \par \par \par \par MORE LEONARDO M.D., ATTENDING RADIOLOGIST \par This document has been electronically signed. Jul 10 2019 4:50PM

## 2019-07-24 NOTE — PHYSICAL EXAM
[General Appearance - Alert] : alert [General Appearance - In No Acute Distress] : in no acute distress [Oriented To Time, Place, And Person] : oriented to person, place, and time [Person] : oriented to person [Place] : oriented to place [Time] : oriented to time [Cranial Nerves Optic (II)] : visual acuity intact bilaterally,  pupils equal round and reactive to light [Cranial Nerves Oculomotor (III)] : extraocular motion intact [Cranial Nerves Trigeminal (V)] : facial sensation intact symmetrically [Cranial Nerves Facial (VII)] : face symmetrical [Cranial Nerves Vestibulocochlear (VIII)] : hearing was intact bilaterally [Cranial Nerves Glossopharyngeal (IX)] : tongue and palate midline [Cranial Nerves Hypoglossal (XII)] : there was no tongue deviation with protrusion [Cranial Nerves Accessory (XI - Cranial And Spinal)] : head turning and shoulder shrug symmetric [Motor Strength] : muscle strength was normal in all four extremities [] : no respiratory distress [Involuntary Movements] : no involuntary movements were seen [Abnormal Walk] : normal gait
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
## 2019-07-24 NOTE — ASSESSMENT
[FreeTextEntry1] : Impression: 39yr old female s/p endovascular embolization of left superior hypophyseal artery aneurysm with pipeline flow diversion 7/9/2019\par \par Plan: \par Reviewed patient post aneurysm embolization MRA NOVA which shows good intracranial blood flow distal to the pipeline construct \par Continue aspirin 325mg daily \par Continue Plavix 75mg daily (PRU 78 from 7/10/2019)\par MRA brain non con with NOVA in three months October 2019 then follow up with Dr. Trevino in the office after\par Her neurologist Dr. Escobar prescribed her steroid for her headaches earlier today and she will let us know if that improves her headaches.

## 2019-07-24 NOTE — DATA REVIEWED
[de-identified] : 7/10/2019 post aneurysm embolization MRA head with NOVA [de-identified] : 7/9/2019 cerebral angiogram and aneurysm embolization
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
## 2019-08-07 ENCOUNTER — RX RENEWAL (OUTPATIENT)
Age: 39
End: 2019-08-07

## 2019-08-19 ENCOUNTER — EMERGENCY (EMERGENCY)
Facility: HOSPITAL | Age: 39
LOS: 1 days | Discharge: HOME | End: 2019-08-19
Attending: EMERGENCY MEDICINE
Payer: COMMERCIAL

## 2019-08-19 VITALS
SYSTOLIC BLOOD PRESSURE: 139 MMHG | DIASTOLIC BLOOD PRESSURE: 89 MMHG | HEART RATE: 88 BPM | WEIGHT: 130.07 LBS | RESPIRATION RATE: 17 BRPM | TEMPERATURE: 98 F | OXYGEN SATURATION: 97 % | HEIGHT: 64 IN

## 2019-08-19 DIAGNOSIS — Z98.890 OTHER SPECIFIED POSTPROCEDURAL STATES: Chronic | ICD-10-CM

## 2019-08-19 LAB
ALBUMIN SERPL ELPH-MCNC: 4.3 G/DL — SIGNIFICANT CHANGE UP (ref 3.3–5)
ALP SERPL-CCNC: 61 U/L — SIGNIFICANT CHANGE UP (ref 40–120)
ALT FLD-CCNC: 34 U/L — SIGNIFICANT CHANGE UP (ref 10–45)
ANION GAP SERPL CALC-SCNC: 12 MMOL/L — SIGNIFICANT CHANGE UP (ref 5–17)
APTT BLD: 28.9 SEC — SIGNIFICANT CHANGE UP (ref 27.5–36.3)
AST SERPL-CCNC: 32 U/L — SIGNIFICANT CHANGE UP (ref 10–40)
BASOPHILS # BLD AUTO: 0 K/UL — SIGNIFICANT CHANGE UP (ref 0–0.2)
BASOPHILS NFR BLD AUTO: 0.4 % — SIGNIFICANT CHANGE UP (ref 0–2)
BILIRUB SERPL-MCNC: 0.2 MG/DL — SIGNIFICANT CHANGE UP (ref 0.2–1.2)
BUN SERPL-MCNC: 9 MG/DL — SIGNIFICANT CHANGE UP (ref 7–23)
CALCIUM SERPL-MCNC: 9.2 MG/DL — SIGNIFICANT CHANGE UP (ref 8.4–10.5)
CHLORIDE SERPL-SCNC: 102 MMOL/L — SIGNIFICANT CHANGE UP (ref 96–108)
CO2 SERPL-SCNC: 26 MMOL/L — SIGNIFICANT CHANGE UP (ref 22–31)
CREAT SERPL-MCNC: 0.55 MG/DL — SIGNIFICANT CHANGE UP (ref 0.5–1.3)
EOSINOPHIL # BLD AUTO: 0.1 K/UL — SIGNIFICANT CHANGE UP (ref 0–0.5)
EOSINOPHIL NFR BLD AUTO: 1 % — SIGNIFICANT CHANGE UP (ref 0–6)
GLUCOSE SERPL-MCNC: 92 MG/DL — SIGNIFICANT CHANGE UP (ref 70–99)
HCT VFR BLD CALC: 37.4 % — SIGNIFICANT CHANGE UP (ref 34.5–45)
HGB BLD-MCNC: 12.3 G/DL — SIGNIFICANT CHANGE UP (ref 11.5–15.5)
INR BLD: 0.92 RATIO — SIGNIFICANT CHANGE UP (ref 0.88–1.16)
LYMPHOCYTES # BLD AUTO: 31.2 % — SIGNIFICANT CHANGE UP (ref 13–44)
LYMPHOCYTES # BLD AUTO: 4 K/UL — HIGH (ref 1–3.3)
MCHC RBC-ENTMCNC: 32.4 PG — SIGNIFICANT CHANGE UP (ref 27–34)
MCHC RBC-ENTMCNC: 32.9 GM/DL — SIGNIFICANT CHANGE UP (ref 32–36)
MCV RBC AUTO: 98.5 FL — SIGNIFICANT CHANGE UP (ref 80–100)
MONOCYTES # BLD AUTO: 0.9 K/UL — SIGNIFICANT CHANGE UP (ref 0–0.9)
MONOCYTES NFR BLD AUTO: 7.1 % — SIGNIFICANT CHANGE UP (ref 2–14)
NEUTROPHILS # BLD AUTO: 7.8 K/UL — HIGH (ref 1.8–7.4)
NEUTROPHILS NFR BLD AUTO: 60.3 % — SIGNIFICANT CHANGE UP (ref 43–77)
PLATELET # BLD AUTO: 230 K/UL — SIGNIFICANT CHANGE UP (ref 150–400)
POTASSIUM SERPL-MCNC: 3.7 MMOL/L — SIGNIFICANT CHANGE UP (ref 3.5–5.3)
POTASSIUM SERPL-SCNC: 3.7 MMOL/L — SIGNIFICANT CHANGE UP (ref 3.5–5.3)
PROT SERPL-MCNC: 7 G/DL — SIGNIFICANT CHANGE UP (ref 6–8.3)
PROTHROM AB SERPL-ACNC: 10.5 SEC — SIGNIFICANT CHANGE UP (ref 10–12.9)
RBC # BLD: 3.8 M/UL — SIGNIFICANT CHANGE UP (ref 3.8–5.2)
RBC # FLD: 12.7 % — SIGNIFICANT CHANGE UP (ref 10.3–14.5)
SODIUM SERPL-SCNC: 140 MMOL/L — SIGNIFICANT CHANGE UP (ref 135–145)
WBC # BLD: 12.9 K/UL — HIGH (ref 3.8–10.5)
WBC # FLD AUTO: 12.9 K/UL — HIGH (ref 3.8–10.5)

## 2019-08-19 PROCEDURE — 99218: CPT

## 2019-08-19 PROCEDURE — 70450 CT HEAD/BRAIN W/O DYE: CPT | Mod: 26

## 2019-08-19 PROCEDURE — 99283 EMERGENCY DEPT VISIT LOW MDM: CPT | Mod: GC

## 2019-08-19 RX ORDER — MAGNESIUM SULFATE 500 MG/ML
1 VIAL (ML) INJECTION ONCE
Refills: 0 | Status: COMPLETED | OUTPATIENT
Start: 2019-08-19 | End: 2019-08-19

## 2019-08-19 RX ORDER — ACETAMINOPHEN 500 MG
1000 TABLET ORAL ONCE
Refills: 0 | Status: COMPLETED | OUTPATIENT
Start: 2019-08-19 | End: 2019-08-19

## 2019-08-19 RX ORDER — METOCLOPRAMIDE HCL 10 MG
10 TABLET ORAL ONCE
Refills: 0 | Status: COMPLETED | OUTPATIENT
Start: 2019-08-19 | End: 2019-08-19

## 2019-08-19 RX ORDER — KETOROLAC TROMETHAMINE 30 MG/ML
15 SYRINGE (ML) INJECTION ONCE
Refills: 0 | Status: DISCONTINUED | OUTPATIENT
Start: 2019-08-19 | End: 2019-08-19

## 2019-08-19 RX ORDER — SODIUM CHLORIDE 9 MG/ML
3 INJECTION INTRAMUSCULAR; INTRAVENOUS; SUBCUTANEOUS EVERY 8 HOURS
Refills: 0 | Status: DISCONTINUED | OUTPATIENT
Start: 2019-08-19 | End: 2019-08-31

## 2019-08-19 RX ORDER — SODIUM CHLORIDE 9 MG/ML
1000 INJECTION INTRAMUSCULAR; INTRAVENOUS; SUBCUTANEOUS ONCE
Refills: 0 | Status: COMPLETED | OUTPATIENT
Start: 2019-08-19 | End: 2019-08-19

## 2019-08-19 RX ADMIN — Medication 15 MILLIGRAM(S): at 14:08

## 2019-08-19 RX ADMIN — SODIUM CHLORIDE 3 MILLILITER(S): 9 INJECTION INTRAMUSCULAR; INTRAVENOUS; SUBCUTANEOUS at 22:09

## 2019-08-19 RX ADMIN — Medication 400 MILLIGRAM(S): at 21:07

## 2019-08-19 RX ADMIN — Medication 10 MILLIGRAM(S): at 14:07

## 2019-08-19 RX ADMIN — SODIUM CHLORIDE 1000 MILLILITER(S): 9 INJECTION INTRAMUSCULAR; INTRAVENOUS; SUBCUTANEOUS at 14:08

## 2019-08-19 RX ADMIN — Medication 100 GRAM(S): at 15:38
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
## 2019-08-19 NOTE — ED CDU PROVIDER DISPOSITION NOTE - NSFOLLOWUPINSTRUCTIONS_ED_ALL_ED_FT
Follow up with your Primary Care Physician within the next 2-3 days  Bring a copy of your test results with you to your appointment  Return to the Emergency Room if you experience new or worsening symptoms severe headache, nausea, vomiting, weakness in extremity, numbness, gait abnormality, confusion   Take Tylenol 1000mg every 6 hours as needed for headache and alternate with NSAIDs Motrin, Aleve, Advil, Ibuprofen 600mg every 8 hours with food   Discontinue Prednisone per neurology recommendations and start taper with Dexamethasone 12mg for 2 days, 8mg for 2 days, 4mg for 4 days, 2mg for 4 days  You Can start preventive headache prophylaxis with Amitriptyline 25mg orally at bedtime   Follow up with outpatient Neurology Clinic Dr. Dakota Gonzales located [address], [place] [address](contact [phone])  Follow up with neuro ophthalmologist Dr. Hilario within 1 week of discharge located at [address] [place] [address] [phone]    Follow up with Dr. Donal Trevino within 2 weeks of discharge [address] [place] [address]  Phone: [phone]
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
## 2019-08-19 NOTE — ED PROVIDER NOTE - ENMT NEGATIVE STATEMENT, MLM
Ears: no ear pain and no hearing problems.Nose: no nasal congestion and no nasal drainage.Mouth/Throat: no tooth pain, no dysphagia, no hoarseness and no throat pain.Neck: no lumps, no pain, no stiffness and no swollen glands.
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
## 2019-08-19 NOTE — ED ADULT TRIAGE NOTE - CHIEF COMPLAINT QUOTE
here 1.5 months ago an had stents placed for an aneurysm. Now having headaches since friday and pressure in the left eye

## 2019-08-19 NOTE — ED PROVIDER NOTE - PHYSICAL EXAMINATION
Gen: WNWD NAD  HEENT: NCAT PERRL EOMI normal pharynx. No frontal or maxillary sinus pressure to palpation. No temporal tenderness to palpation.   Neck: supple  CV: RRR, no murmur  Lung: CTA BL  Abd: +BS soft NTND  Ext: wwp, palp pulses, FROMx4, no cce  Neuro: CN grossly intact, sensation intact, motor 5/5 throughout   Skin: warm, dry, intact. No rashes or lesions.

## 2019-08-19 NOTE — CONSULT NOTE ADULT - ASSESSMENT
39F s/p elective pipeline of left ICA aneurysm on 7/9/19 presented to ED with continued severe HA. She was experiencing tension like HA after procedure and was prescribed prednisone which did not help. ~2 weeks ago she had several days of rash/urticaria for which she took benadryl with moderate relief. Recently the pain has sharpened in nature and is now left retro-orbital, rated 10/10. She was again prescribed steroids which did not help. CT head in ED shows thrombosed aneurysm and no hemorrhage. Exam: Intact, severe L retro-orbital pain, mild new onset L neck/shoulder pain.  - No acute neurosurgical intervention  - Rec neurology eval for HA 39F s/p elective pipeline of left ICA aneurysm on 7/9/19 presented to ED with continued severe HA. She was experiencing tension like HA after procedure and was prescribed prednisone which did not help. ~2 weeks ago she had several days of rash/urticaria for which she took benadryl with moderate relief. Recently the pain has sharpened in nature and is now left retro-orbital, rated 10/10. She was again prescribed steroids which did not help. CT head in ED shows thrombosed aneurysm and no hemorrhage. Exam: Intact, severe L retro-orbital pain, mild new onset L neck/shoulder pain.  - No acute neurosurgical intervention  - Rec neurology eval for HA  - MRI brain w/wo 39F s/p elective pipeline of left ICA aneurysm on 7/9/19 presented to ED with continued severe HA. She was experiencing tension like HA after procedure and was prescribed prednisone which did not help. ~2 weeks ago she had several days of rash/urticaria for which she took benadryl with moderate relief. Recently the pain has sharpened in nature and is now left retro-orbital, rated 10/10. She was again prescribed steroids which did not help. CT head in ED shows thrombosed aneurysm and no hemorrhage. Exam: Intact, severe L retro-orbital pain, mild new onset L neck/shoulder pain.  - No acute neurosurgical intervention  - Rec neurology eval for HA  - MRI brain w/wo inpatient or outpatient
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
## 2019-08-19 NOTE — ED PROVIDER NOTE - CHPI ED SYMPTOMS NEG
no fever/no ear pain, no jaw pain, no  tooth pain, no discharge or drainage from eyes, no visual changes, no vomiting, no rashes localized to area of HA, no photophobia, no runny nose, no cough, no pain when moving eyes.

## 2019-08-19 NOTE — ED CDU PROVIDER DISPOSITION NOTE - ATTENDING CONTRIBUTION TO CARE
Agree with above, Patricio Dumont MD, FACEP  Patient with normal cn exam, normal neurological exam, cleared by neurosurgery and neurology for emergent or urgent therapies/interventions.  Patient serially evaluated throughout emergency department course. There was no acute deterioration up to this time in the department. Patient has demonstrated marked clinical improvement, feels better at this time according to emergency department team. Agree with goals/plan of emergency department care as described in electronic medical record, including diagnostics, therapeutics and consultation as clinically warranted. Will discharge home with close outpatient follow up with primary care physician/provider and specialist if necessary. Patient educated on concerning signs and features to return to the emergency department, in layman terms, including but not limited to: nausea, vomiting, fever, chills, persistent/worsening symptoms or any concerns at all. No immediate life threatening issues present on history or clinical exam. Patient is a safe disposition home, has capacity and insight into their condition, is ambulatory in the Emergency Department with no further questions and will follow up with their doctor(s) this week. Patient understands anticipatory guidance and was given strict return and follow up precautions. The patient has been informed, in layman terms, of all concerning signs and symptoms to return to Emergency Department, the necessity to follow up with the PMD/Clinic/follow up provided within 2-3 days was explained, and the patient reports understanding of above with capacity and insight.
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
## 2019-08-19 NOTE — ED PROVIDER NOTE - PROGRESS NOTE DETAILS
Pain improved from 10 to 8 after reglan and toradol. Will give mag, D/w Nsx who rec CT imaging. Nsx and neuro to eval.

## 2019-08-19 NOTE — ED CDU PROVIDER INITIAL DAY NOTE - PROGRESS NOTE DETAILS
CDU PROGRESS NOTE IJEOMA ARTIS: Pt evaluated by Opthalmology, Vision 20/20 -2 OU, no APD, IOP WNL, EOMs full w/ no ptosis. CVF and color full. No edema of pallor noted of optic nerves OU. Ophthalmology exam otherwise unremarkable - no evidence of ocular pathology. Will continue with plan, pain control, neuro checks, MRI w/wo contrast in AM.

## 2019-08-19 NOTE — ED CDU PROVIDER DISPOSITION NOTE - PLAN OF CARE
As per Neurology...........  Stop taking Prednisone, Start Dexamethasone 12mg for 2 days, 8mg for 2 days, 4mg for 4 days, 2mg for 4 days. Also start Amitriptyline 25mg PO at night as needed prophylaxis for headache.   F/u with outpatient Neurology Clinic Address: [address] [place] [address] Phone: [phone]   Follow-Up with your ophthalmologist or in the [facility] within 1 week of discharge. [address] [facility] [place] [address] tel [phone]  Follow up with your Primary Care Physician within the next 2-3 days  Bring a copy of your test results with you to your appointment  Return to the Emergency Room if you experience new or worsening symptoms
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
## 2019-08-19 NOTE — ED CDU PROVIDER DISPOSITION NOTE - CARE PROVIDER_API CALL
Donal Trevino)  Neurological Surgery  [address] [place] [address]  Phone: [phone]  Fax: [phone]  Follow Up Time:
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
## 2019-08-19 NOTE — ED ADULT NURSE NOTE - NSIMPLEMENTINTERV_GEN_ALL_ED
Implemented All Universal Safety Interventions:  [place] to call system. Call bell, personal items and telephone within reach. Instruct patient to call for assistance. Room bathroom lighting operational. Non-slip footwear when patient is off stretcher. Physically safe environment: no spills, clutter or unnecessary equipment. Stretcher in lowest position, wheels locked, appropriate side rails in place.

## 2019-08-19 NOTE — ED CDU PROVIDER INITIAL DAY NOTE - DETAILS
R/o acute intracranial pathology  1. Frequent reevaluations  2. Telemetry  3. Neuro checks  4. MRI w/wo contrast  5. Neuro/ Neurosurg/Opth following  Plan d/w Dr. Musa
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
## 2019-08-20 VITALS
RESPIRATION RATE: 18 BRPM | OXYGEN SATURATION: 96 % | TEMPERATURE: 98 F | DIASTOLIC BLOOD PRESSURE: 74 MMHG | SYSTOLIC BLOOD PRESSURE: 106 MMHG | HEART RATE: 72 BPM

## 2019-08-20 PROCEDURE — 99217: CPT

## 2019-08-20 PROCEDURE — 80053 COMPREHEN METABOLIC PANEL: CPT

## 2019-08-20 PROCEDURE — 85027 COMPLETE CBC AUTOMATED: CPT

## 2019-08-20 PROCEDURE — 70450 CT HEAD/BRAIN W/O DYE: CPT

## 2019-08-20 PROCEDURE — 70553 MRI BRAIN STEM W/O & W/DYE: CPT

## 2019-08-20 PROCEDURE — 96375 TX/PRO/DX INJ NEW DRUG ADDON: CPT

## 2019-08-20 PROCEDURE — 99284 EMERGENCY DEPT VISIT MOD MDM: CPT | Mod: 25

## 2019-08-20 PROCEDURE — G0378: CPT

## 2019-08-20 PROCEDURE — A9585: CPT

## 2019-08-20 PROCEDURE — 70553 MRI BRAIN STEM W/O & W/DYE: CPT | Mod: 26

## 2019-08-20 PROCEDURE — 85610 PROTHROMBIN TIME: CPT

## 2019-08-20 PROCEDURE — 96376 TX/PRO/DX INJ SAME DRUG ADON: CPT

## 2019-08-20 PROCEDURE — 96374 THER/PROPH/DIAG INJ IV PUSH: CPT | Mod: XU

## 2019-08-20 PROCEDURE — 85730 THROMBOPLASTIN TIME PARTIAL: CPT

## 2019-08-20 RX ORDER — AMITRIPTYLINE HCL 25 MG
1 TABLET ORAL
Qty: 14 | Refills: 0
Start: 2019-08-20 | End: 2019-09-02

## 2019-08-20 RX ORDER — KETOROLAC TROMETHAMINE 30 MG/ML
30 SYRINGE (ML) INJECTION EVERY 6 HOURS
Refills: 0 | Status: DISCONTINUED | OUTPATIENT
Start: 2019-08-20 | End: 2019-08-20

## 2019-08-20 RX ORDER — KETOROLAC TROMETHAMINE 30 MG/ML
30 SYRINGE (ML) INJECTION ONCE
Refills: 0 | Status: DISCONTINUED | OUTPATIENT
Start: 2019-08-20 | End: 2019-08-20

## 2019-08-20 RX ORDER — ACETAMINOPHEN 500 MG
975 TABLET ORAL EVERY 6 HOURS
Refills: 0 | Status: DISCONTINUED | OUTPATIENT
Start: 2019-08-20 | End: 2019-08-31

## 2019-08-20 RX ORDER — DEXAMETHASONE 0.5 MG/5ML
1 ELIXIR ORAL
Qty: 26 | Refills: 0
Start: 2019-08-20

## 2019-08-20 RX ADMIN — Medication 30 MILLIGRAM(S): at 03:46

## 2019-08-20 RX ADMIN — SODIUM CHLORIDE 3 MILLILITER(S): 9 INJECTION INTRAMUSCULAR; INTRAVENOUS; SUBCUTANEOUS at 07:39

## 2019-08-20 RX ADMIN — Medication 975 MILLIGRAM(S): at 08:17

## 2019-08-20 NOTE — ED CDU PROVIDER SUBSEQUENT DAY NOTE - ATTENDING CONTRIBUTION TO CARE
Agree with above, Patricio Dumont MD, FACEP  Will follow resolution of headache and MRI.   Will follow up on imaging, reassess and disposition as clinically indicated.

## 2019-08-20 NOTE — ED CDU PROVIDER SUBSEQUENT DAY NOTE - PROGRESS NOTE DETAILS
IJEOMA Herrera: Patient seen at bedside in [facility].  VSS.  Patient resting comfortably but does note 10/10 left sided headache described to be throbbing. Patient denied nausea vomiting, chest pain, SOB, abdominal pain, visual disturbances, weakness, gait abnormality, numbness. patient pending MRI. Will give Tylenol. On physical exam: patient has no neuro defecits SCM/Trap 5/5 strength, EOMI, PERRLA, normal finger to nose b/l, no tongue deviation, face symmetrical no droop, no pronator drift, 5/5 muscle b/l upper/lower extremities, neuro sensation intact b/l upper/lower extremities, normal shin to leg b/l, normal gait, no slurred speech IJEOMA Herrera: Patient seen at bedside in [facility].  VSS.  Patient resting comfortably sleeping in bed. pending neuro re-eval and MR results IJEOMA Herrera: paged neuro and neurosurgery regarding MR results awaiting call back for recs IJEOMA Herrera: MR showed no stroke. cleared from neuro Dr. Mcfadden and neurosurgery standpoint. ED attending Dr. Dumont aware of the above and agreed.

## 2019-08-20 NOTE — ED CDU PROVIDER SUBSEQUENT DAY NOTE - MEDICAL DECISION MAKING DETAILS
See observation monitoring plan section from initial day note.   Patient with improved headache, recommendations appreciated from neurosurgery and neurology.

## 2019-08-20 NOTE — ED ADULT NURSE REASSESSMENT NOTE - NS ED NURSE REASSESS COMMENT FT1
Report taken from Milvia GRIFFITH. states pt have a good night with no complaints. Will continue to monitor.

## 2019-08-20 NOTE — ED CDU PROVIDER SUBSEQUENT DAY NOTE - HISTORY
CDU PROGRESS NOTE IJEOMA ARTIS: Pt resting in stretcher, NAD, reported having increasing pain to left side of head. No focal neuro deficits on exam or visual disturbances reported. Patient medicated Toradol 30mg as recommended by Neurology. Will continue to monitor.

## 2019-09-05 ENCOUNTER — APPOINTMENT (OUTPATIENT)
Dept: OPHTHALMOLOGY | Facility: CLINIC | Age: 39
End: 2019-09-05

## 2019-09-27 NOTE — ED PROVIDER NOTE - PRO INTERPRETER NEED 2
English Chonodrocutaneous Helical Advancement Flap Text: The defect edges were debeveled with a #15 scalpel blade.  Given the location of the defect and the proximity to free margins a chondrocutaneous helical advancement flap was deemed most appropriate.  Using a sterile surgical marker, the appropriate advancement flap was drawn incorporating the defect and placing the expected incisions within the relaxed skin tension lines where possible.    The area thus outlined was incised deep to adipose tissue with a #15 scalpel blade.  The skin margins were undermined to an appropriate distance in all directions utilizing iris scissors.

## 2019-10-07 ENCOUNTER — RX CHANGE (OUTPATIENT)
Age: 39
End: 2019-10-07

## 2019-10-07 RX ORDER — ASPIRIN 325 MG/1
325 TABLET ORAL
Qty: 30 | Refills: 6 | Status: ACTIVE | COMMUNITY
Start: 2019-10-07 | End: 1900-01-01

## 2019-10-30 ENCOUNTER — OUTPATIENT (OUTPATIENT)
Dept: OUTPATIENT SERVICES | Facility: HOSPITAL | Age: 39
LOS: 1 days | End: 2019-10-30
Payer: COMMERCIAL

## 2019-10-30 ENCOUNTER — APPOINTMENT (OUTPATIENT)
Dept: MRI IMAGING | Facility: HOSPITAL | Age: 39
End: 2019-10-30

## 2019-10-30 DIAGNOSIS — I67.1 CEREBRAL ANEURYSM, NONRUPTURED: ICD-10-CM

## 2019-10-30 DIAGNOSIS — Z98.890 OTHER SPECIFIED POSTPROCEDURAL STATES: Chronic | ICD-10-CM

## 2019-10-30 PROCEDURE — 70544 MR ANGIOGRAPHY HEAD W/O DYE: CPT | Mod: 26

## 2019-10-30 PROCEDURE — 70544 MR ANGIOGRAPHY HEAD W/O DYE: CPT

## 2019-11-14 ENCOUNTER — APPOINTMENT (OUTPATIENT)
Dept: NEUROSURGERY | Facility: CLINIC | Age: 39
End: 2019-11-14
Payer: COMMERCIAL

## 2019-11-14 VITALS
TEMPERATURE: 98.1 F | BODY MASS INDEX: 23.39 KG/M2 | OXYGEN SATURATION: 98 % | RESPIRATION RATE: 18 BRPM | HEIGHT: 64 IN | HEART RATE: 72 BPM | DIASTOLIC BLOOD PRESSURE: 71 MMHG | SYSTOLIC BLOOD PRESSURE: 110 MMHG | WEIGHT: 137 LBS

## 2019-11-14 PROCEDURE — 99213 OFFICE O/P EST LOW 20 MIN: CPT

## 2019-11-14 NOTE — PHYSICAL EXAM
[General Appearance - In No Acute Distress] : in no acute distress [General Appearance - Alert] : alert [Person] : oriented to person [Place] : oriented to place [Time] : oriented to time [Cranial Nerves Trigeminal (V)] : facial sensation intact symmetrically [Cranial Nerves Optic (II)] : visual acuity intact bilaterally,  pupils equal round and reactive to light [Cranial Nerves Oculomotor (III)] : extraocular motion intact [Cranial Nerves Vestibulocochlear (VIII)] : hearing was intact bilaterally [Cranial Nerves Facial (VII)] : face symmetrical [Cranial Nerves Hypoglossal (XII)] : there was no tongue deviation with protrusion [Cranial Nerves Glossopharyngeal (IX)] : tongue and palate midline [Cranial Nerves Accessory (XI - Cranial And Spinal)] : head turning and shoulder shrug symmetric [Involuntary Movements] : no involuntary movements were seen [Motor Strength] : muscle strength was normal in all four extremities [Abnormal Walk] : normal gait [] : no respiratory distress
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
## 2019-11-19 NOTE — RESULTS/DATA
[FreeTextEntry1] : EXAM: MR ANGIO BRAIN \par \par \par PROCEDURE DATE: 10/30/2019 \par \par \par \par \par INTERPRETATION: Clinical indication: Post stenting of left ophthalmic \par artery aneurysm, follow-up \par \par  3-D axial noncontrast MRA were performed on the cervical and intracranial \par vessels, respectively. Intravascular flow quantification was performed using \par gated 2D phase contrast MR, imaged perpendicular to the vessel axis. Images \par were post processed NOVA software and a NOVA flow study report is available. \par \par Comparison is made to the previous Nova MRA of 7/10/2019. \par \par \par There is mild signal dropout overlying the left supraclinoid internal \par carotid artery related to the pipeline stent. Good intracranial flow without \par significant change from the prior examination. \par \par Flow is as follows in milliliters per minute. \par \par \par , RMCA 144, RACA 101, RACA2 60 compared with prior 7/10/2019 \par \par , RMCA 158, RACA 107, RACA2 50. \par \par LICA 197, LMCA 132, LACA 71, LACA2 65 compared with prior \par \par LICA 286, LMCA 185, LACA 84, LACA2 84. \par \par , LVA 98, , RPCA 93, LPCA 76 compared with \par \par , , , RPCA 96, LPCA 89. \par \par \par IMPRESSION: Good intracranial flow without significant change from \par 7/10/2019. Left supraclinoid pipeline stent for ophthalmic artery aneurysm. \par \par \par \par \par \par \par \par \par \par MORE LEONARDO M.D., ATTENDING RADIOLOGIST \par This document has been electronically signed. Oct 30 2019 12:43PM \par

## 2019-11-19 NOTE — ASSESSMENT
[FreeTextEntry1] : Impression: 39yr old female s/p endovascular embolization of left superior hypophyseal artery aneurysm with pipeline flow diversion 7/9/2019\par \par Plan: \par Reviewed patient post aneurysm embolization MRA NOVA which shows good intracranial blood flow distal to the pipeline construct \par Continue aspirin 325mg daily \par Continue Plavix 75mg daily (PRU 78 from 7/10/2019) \par January 7, 2020 follow up cerebral angiogram. \par \par \par

## 2020-01-03 ENCOUNTER — OUTPATIENT (OUTPATIENT)
Dept: OUTPATIENT SERVICES | Facility: HOSPITAL | Age: 40
LOS: 1 days | End: 2020-01-03
Payer: COMMERCIAL

## 2020-01-03 VITALS
WEIGHT: 136.03 LBS | TEMPERATURE: 98 F | HEIGHT: 64 IN | RESPIRATION RATE: 16 BRPM | DIASTOLIC BLOOD PRESSURE: 84 MMHG | HEART RATE: 61 BPM | OXYGEN SATURATION: 98 % | SYSTOLIC BLOOD PRESSURE: 116 MMHG

## 2020-01-03 DIAGNOSIS — Z98.890 OTHER SPECIFIED POSTPROCEDURAL STATES: Chronic | ICD-10-CM

## 2020-01-03 DIAGNOSIS — I67.1 CEREBRAL ANEURYSM, NONRUPTURED: ICD-10-CM

## 2020-01-03 DIAGNOSIS — Z98.891 HISTORY OF UTERINE SCAR FROM PREVIOUS SURGERY: Chronic | ICD-10-CM

## 2020-01-03 DIAGNOSIS — Z01.818 ENCOUNTER FOR OTHER PREPROCEDURAL EXAMINATION: ICD-10-CM

## 2020-01-03 DIAGNOSIS — Z90.89 ACQUIRED ABSENCE OF OTHER ORGANS: Chronic | ICD-10-CM

## 2020-01-03 LAB
ANION GAP SERPL CALC-SCNC: 12 MMOL/L — SIGNIFICANT CHANGE UP (ref 5–17)
BLD GP AB SCN SERPL QL: NEGATIVE — SIGNIFICANT CHANGE UP
BUN SERPL-MCNC: 11 MG/DL — SIGNIFICANT CHANGE UP (ref 7–23)
CALCIUM SERPL-MCNC: 9.2 MG/DL — SIGNIFICANT CHANGE UP (ref 8.4–10.5)
CHLORIDE SERPL-SCNC: 100 MMOL/L — SIGNIFICANT CHANGE UP (ref 96–108)
CO2 SERPL-SCNC: 24 MMOL/L — SIGNIFICANT CHANGE UP (ref 22–31)
CREAT SERPL-MCNC: 0.52 MG/DL — SIGNIFICANT CHANGE UP (ref 0.5–1.3)
GLUCOSE SERPL-MCNC: 85 MG/DL — SIGNIFICANT CHANGE UP (ref 70–99)
HCT VFR BLD CALC: 40 % — SIGNIFICANT CHANGE UP (ref 34.5–45)
HGB BLD-MCNC: 13.7 G/DL — SIGNIFICANT CHANGE UP (ref 11.5–15.5)
MCHC RBC-ENTMCNC: 32.9 PG — SIGNIFICANT CHANGE UP (ref 27–34)
MCHC RBC-ENTMCNC: 34.3 GM/DL — SIGNIFICANT CHANGE UP (ref 32–36)
MCV RBC AUTO: 95.9 FL — SIGNIFICANT CHANGE UP (ref 80–100)
PLATELET # BLD AUTO: 292 K/UL — SIGNIFICANT CHANGE UP (ref 150–400)
POTASSIUM SERPL-MCNC: 4 MMOL/L — SIGNIFICANT CHANGE UP (ref 3.5–5.3)
POTASSIUM SERPL-SCNC: 4 MMOL/L — SIGNIFICANT CHANGE UP (ref 3.5–5.3)
RBC # BLD: 4.17 M/UL — SIGNIFICANT CHANGE UP (ref 3.8–5.2)
RBC # FLD: 13.1 % — SIGNIFICANT CHANGE UP (ref 10.3–14.5)
RH IG SCN BLD-IMP: POSITIVE — SIGNIFICANT CHANGE UP
SODIUM SERPL-SCNC: 136 MMOL/L — SIGNIFICANT CHANGE UP (ref 135–145)
WBC # BLD: 6.85 K/UL — SIGNIFICANT CHANGE UP (ref 3.8–10.5)
WBC # FLD AUTO: 6.85 K/UL — SIGNIFICANT CHANGE UP (ref 3.8–10.5)

## 2020-01-03 PROCEDURE — 86850 RBC ANTIBODY SCREEN: CPT

## 2020-01-03 PROCEDURE — G0463: CPT

## 2020-01-03 PROCEDURE — 85027 COMPLETE CBC AUTOMATED: CPT

## 2020-01-03 PROCEDURE — 86900 BLOOD TYPING SEROLOGIC ABO: CPT

## 2020-01-03 PROCEDURE — 80048 BASIC METABOLIC PNL TOTAL CA: CPT

## 2020-01-03 PROCEDURE — 86901 BLOOD TYPING SEROLOGIC RH(D): CPT

## 2020-01-03 NOTE — H&P PST ADULT - NSICDXPASTSURGICALHX_GEN_ALL_CORE_FT
PAST SURGICAL HISTORY:  H/O ovarian cystectomy PAST SURGICAL HISTORY:  H/O cerebral aneurysm repair 2019 cerebral angio with 2 pipeline stent placement    H/O  section 7 years ago    H/O ovarian cystectomy  - benign - one ovary and one fallopian tube removed    History of tonsillectomy
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
## 2020-01-03 NOTE — H&P PST ADULT - NSICDXPASTMEDICALHX_GEN_ALL_CORE_FT
PAST MEDICAL HISTORY:  Aneurysm     Ovarian cyst PAST MEDICAL HISTORY:  Aneurysm left superior hypophyseal aneurysm, unruptured    Ovarian cyst benign

## 2020-01-03 NOTE — H&P PST ADULT - NSANTHOSAYNRD_GEN_A_CORE
No. ENMA screening performed.  STOP BANG Legend: 0-2 = LOW Risk; 3-4 = INTERMEDIATE Risk; 5-8 = HIGH Risk

## 2020-01-03 NOTE — H&P PST ADULT - HEIGHT IN INCHES
Callie Rueda, Please refer to ME dated today, 8/8/18 for update on Cely. She has made nice improvement reducing bladder urgency/frequency and shown improved coordination of the PFM's with biofeedback and internal assessment. Please contact me with any concerns, she will see you in early September. Take care! Kathy
4

## 2020-01-07 ENCOUNTER — OUTPATIENT (OUTPATIENT)
Dept: OUTPATIENT SERVICES | Facility: HOSPITAL | Age: 40
LOS: 1 days | End: 2020-01-07
Payer: COMMERCIAL

## 2020-01-07 ENCOUNTER — APPOINTMENT (OUTPATIENT)
Dept: NEUROSURGERY | Facility: CLINIC | Age: 40
End: 2020-01-07
Payer: COMMERCIAL

## 2020-01-07 DIAGNOSIS — Z98.890 OTHER SPECIFIED POSTPROCEDURAL STATES: Chronic | ICD-10-CM

## 2020-01-07 DIAGNOSIS — Z98.891 HISTORY OF UTERINE SCAR FROM PREVIOUS SURGERY: Chronic | ICD-10-CM

## 2020-01-07 DIAGNOSIS — Z90.89 ACQUIRED ABSENCE OF OTHER ORGANS: Chronic | ICD-10-CM

## 2020-01-07 DIAGNOSIS — I67.1 CEREBRAL ANEURYSM, NONRUPTURED: ICD-10-CM

## 2020-01-07 LAB — PA ADP PRP-ACNC: 104 PRU — LOW (ref 194–417)

## 2020-01-07 PROCEDURE — C1769: CPT

## 2020-01-07 PROCEDURE — 85576 BLOOD PLATELET AGGREGATION: CPT

## 2020-01-07 PROCEDURE — C1894: CPT

## 2020-01-07 PROCEDURE — 36224 PLACE CATH CAROTD ART: CPT | Mod: LT

## 2020-01-07 PROCEDURE — 36224 PLACE CATH CAROTD ART: CPT

## 2020-01-07 PROCEDURE — C1887: CPT

## 2020-01-07 RX ORDER — SODIUM CHLORIDE 9 MG/ML
1000 INJECTION INTRAMUSCULAR; INTRAVENOUS; SUBCUTANEOUS
Refills: 0 | Status: DISCONTINUED | OUTPATIENT
Start: 2020-01-07 | End: 2020-02-05

## 2020-01-07 NOTE — CHART NOTE - NSCHARTNOTEFT_GEN_A_CORE
Interventional Neuro Radiology  Pre-Procedure Note PA-C    This is a 39y ____ hand dominant Female      HPI:      Allergies: No Known Allergies      PAST MEDICAL & SURGICAL HISTORY:  Ovarian cyst: benign  Aneurysm: left superior hypophyseal aneurysm, unruptured  H/O cerebral aneurysm repair: 2019 cerebral angio with 2 pipeline stent placement  H/O  section: 7 years ago  History of tonsillectomy  H/O ovarian cystectomy: 2017 - benign - one ovary and one fallopian tube removed      Social History:     FAMILY HISTORY:      Current Medications: sodium chloride 0.9%. 1000 milliLiter(s) IV Continuous <Continuous>      Labs:                   Blood Bank:       Assessment/Plan:     This is a 39y  year old right  hand dominant Female  presents with   Patient presents to neuro-IR for selective cerebral angiography.   Procedure, goals, risks, benefits and alternatives  were discussed with patient and patient's family.  All questions were answered.  Risks include but are not limited to stroke, vessel injury, hemorrhage, and or right  groin hematoma.  Patient demonstrates understanding  of all risks involved with this procedure and wishes to continue.   Appropriate  content was obtained from patient and consent is in the patient's chart. Interventional Neuro Radiology  Pre-Procedure Note PA-C    This is a 39 year old right hand dominant female            Allergies: No Known Allergies  PMHX; ovarian cyst: benign, left superior hypophyseal aneurysm, unruptured  PSHX: cerebral aneurysm repair: 2019 cerebral angio with 2 pipeline stent placement,  section, tonsillectomy, ovarian cystectomy: 2017   Social History: , social smoker   FAMILY HISTORY:    Complete Blood Count (20 @ 23:10)    WBC Count: 6.85 K/uL    Hemoglobin: 13.7 g/dL    Hematocrit: 40.0 %    Platelet Count - Automated: 292 K/uL    Basic Metabolic Panel (20 @ 18:12)    Sodium, Serum: 136 mmol/L    Potassium, Serum: 4.0 mmol/L    Chloride, Serum: 100 mmol/L    Carbon Dioxide, Serum: 24 mmol/L    Anion Gap, Serum: 12 mmol/L    Blood Urea Nitrogen, Serum: 11 mg/dL    Creatinine, Serum: 0.52 mg/dL    Glucose, Serum: 85 mg/dL    Calcium, Total Serum: 9.2 mg/dL    Blood Bank: ABO Interpretation: A positive (20 @ 18:14)    Assessment/Plan:   This is a 39 year old right hand dominant female returns to Neuro-IR for selective cerebral angiography.   Procedure, goals, risks, benefits and alternatives were discussed with patient and patient's .  All questions were answered. Risks include but are not limited to stroke, vessel injury, hemorrhage, and or right  groin hematoma. Patient demonstrates understanding of all risks involved with this procedure and wishes to continue. Appropriate content was obtained from patient and consent is in the patient's chart.

## 2020-01-07 NOTE — CHART NOTE - NSCHARTNOTEFT_GEN_A_CORE
Interventional Neuro- Radiology   Procedure Note PA-BRANDON    Procedure: Selective Cerebral Angiography   Pre- Procedure Diagnosis:  Post- Procedure Diagnosis:    : Dr Donal Trevino  fellow:      Dr Joshua Kingsley   resident:   Dr Collin Rizzo  Physician Assistant: Sabrina Farah PA-C    Nurse:  Radiologic Tech:  Anesthesiologist:  Sheath:      I/Os: EBL less than 10cc  IV fluids:     cc   Urine output due to void  Contrast Omnipaque 240                 Vitals: BP         HR      Spo2     %          Preliminary Report:  Using a 5 Swedish long sheath to the right groin under MAC sedation via left vertebral artery,  left internal carotid artery, left external carotid artery, right vertebral artery, right internal carotid artery, right external carotid artery a selective cerebral angiography was performed and demonstrated                       Official note to follow.  Patient tolerated procedure well, hemodynamically stable, no change in neurological status compared to baseline.  Results discussed with neuro ICU team, patient and patient's family. Right groin sheath was removed, manual compression held to hemostasis for 20 minutes, no active bleeding, no hematoma, quick clot and safeguard balloon dressing applied at Interventional Neuro- Radiology   Procedure Note PA-C    Procedure: Selective Cerebral Angiography   Pre- Procedure Diagnosis:  Post- Procedure Diagnosis:    : Dr Donal Trevino  resident:   Dr Collin Rizzo  Physician Assistant: Sabrina Farah PA-C    Nurse:  Radiologic Tech:  Anesthesiologist:  Sheath:      I/Os: EBL less than 10cc  IV fluids:     cc   Urine output due to void  Contrast Omnipaque 240                 Vitals: /57       HR 57    Spo2 99%          Preliminary Report:  Using a 5 Armenian long sheath to the right groin under MAC sedation via left vertebral artery,  left internal carotid artery, left external carotid artery, right vertebral artery, right internal carotid artery, right external carotid artery a selective cerebral angiography was performed and demonstrated                       Official note to follow.  Patient tolerated procedure well, hemodynamically stable, no change in neurological status compared to baseline.  Results discussed with neuro ICU team, patient and patient's family. Right groin sheath was removed, manual compression held to hemostasis for 20 minutes, no active bleeding, no hematoma, quick clot and safeguard balloon dressing applied at Interventional Neuro- Radiology   Procedure Note PA-C    Procedure: Selective Cerebral Angiography   Pre- Procedure Diagnosis:  Post- Procedure Diagnosis: complete persistent occlusion of aneurysm     : Dr Donal Trevino  resident:   Dr Collin Rizzo  Physician Assistant: Sabrina Farah PA-C    Nurse:                      Prakash Wang RN      Radiologic Tech:      Waleska Garcia LRT  Anesthesiologist:       Dr Yo Kuhn   Sheath:                      5 Fijian short       I/Os: EBL less than 10cc  IV fluids: 100cc  Urine output due to void  Contrast Omnipaque 240 18cc              Vitals: /57       HR 57    Spo2 99%          Preliminary Report:  Using a 5 Fijian short sheath to the right groin under MAC sedation via left internal carotid artery was performed and demonstrated complete persistent occlusion of aneurysm. Official note to follow. Patient tolerated procedure well, hemodynamically stable, no change in neurological status compared to baseline.  Results discussed with neuro ICU team, patient and patient's family. Right groin sheath was removed, manual compression held to hemostasis for 20 minutes, no active bleeding, no hematoma, quick clot and safeguard balloon dressing applied at 1430 hours. Interventional Neuro- Radiology   Procedure Note PA-C    Procedure: Selective Cerebral Angiography   Pre- Procedure Diagnosis:  Post- Procedure Diagnosis: complete persistent occlusion of aneurysm     : Dr Donal Trevino  resident:   Dr Collin Rizzo  Physician Assistant: Sabrina Farah PA-C    Nurse:                      Prakash Wang RN      Radiologic Tech:      Waleska Garcia LRT  Anesthesiologist:      Dr Yo Kuhn   Sheath:                     5 Faroese short sheath       I/Os: EBL less than 10cc  IV fluids: 100cc  Urine output due to void  Contrast Omnipaque 240 18cc              Vitals: /57       HR 57    Spo2 99%          Preliminary Report:  Using a 5 Faroese short sheath to the right groin under MAC sedation via left internal carotid artery was performed and demonstrated complete persistent occlusion of aneurysm. Official note to follow. Patient tolerated procedure well, hemodynamically stable, no change in neurological status compared to baseline.  Results discussed with neuro ICU team, patient and patient's family. Right groin sheath was removed, manual compression held to hemostasis for 20 minutes, no active bleeding, no hematoma, quick clot and safeguard balloon dressing applied at 1430 hours. Disposition recovery room 1 st floor.

## 2020-01-11 DIAGNOSIS — I67.1 CEREBRAL ANEURYSM, NONRUPTURED: ICD-10-CM

## 2020-01-14 PROBLEM — I72.9 ANEURYSM OF UNSPECIFIED SITE: Chronic | Status: ACTIVE | Noted: 2019-08-19

## 2020-01-14 PROBLEM — N83.209 UNSPECIFIED OVARIAN CYST, UNSPECIFIED SIDE: Chronic | Status: ACTIVE | Noted: 2019-08-19

## 2020-01-30 ENCOUNTER — APPOINTMENT (OUTPATIENT)
Dept: NEUROSURGERY | Facility: CLINIC | Age: 40
End: 2020-01-30
Payer: COMMERCIAL

## 2020-01-30 VITALS
HEIGHT: 64 IN | HEART RATE: 68 BPM | WEIGHT: 135 LBS | OXYGEN SATURATION: 97 % | RESPIRATION RATE: 18 BRPM | DIASTOLIC BLOOD PRESSURE: 87 MMHG | SYSTOLIC BLOOD PRESSURE: 130 MMHG | BODY MASS INDEX: 23.05 KG/M2 | TEMPERATURE: 98 F

## 2020-01-30 PROCEDURE — 99213 OFFICE O/P EST LOW 20 MIN: CPT

## 2020-01-30 RX ORDER — CLOPIDOGREL BISULFATE 75 MG/1
75 TABLET, FILM COATED ORAL DAILY
Qty: 90 | Refills: 3 | Status: DISCONTINUED | COMMUNITY
Start: 2019-10-07 | End: 2020-01-30

## 2020-01-30 RX ORDER — CLOPIDOGREL BISULFATE 75 MG/1
75 TABLET, FILM COATED ORAL DAILY
Qty: 30 | Refills: 8 | Status: DISCONTINUED | COMMUNITY
Start: 2019-08-07 | End: 2020-01-30

## 2020-01-30 RX ORDER — CLOPIDOGREL 75 MG/1
75 TABLET, FILM COATED ORAL
Refills: 0 | Status: DISCONTINUED | COMMUNITY
End: 2020-01-30

## 2020-01-30 NOTE — PHYSICAL EXAM
[General Appearance - Alert] : alert [General Appearance - In No Acute Distress] : in no acute distress [Person] : oriented to person [Place] : oriented to place [Time] : oriented to time [Cranial Nerves Optic (II)] : visual acuity intact bilaterally,  pupils equal round and reactive to light [Cranial Nerves Oculomotor (III)] : extraocular motion intact [Cranial Nerves Facial (VII)] : face symmetrical [Cranial Nerves Trigeminal (V)] : facial sensation intact symmetrically [Cranial Nerves Vestibulocochlear (VIII)] : hearing was intact bilaterally [Cranial Nerves Glossopharyngeal (IX)] : tongue and palate midline [Cranial Nerves Accessory (XI - Cranial And Spinal)] : head turning and shoulder shrug symmetric [Cranial Nerves Hypoglossal (XII)] : there was no tongue deviation with protrusion [Motor Strength] : muscle strength was normal in all four extremities [Involuntary Movements] : no involuntary movements were seen [] : no respiratory distress [Abnormal Walk] : normal gait

## 2020-02-05 NOTE — REASON FOR VISIT
[Follow-Up: _____] : a [unfilled] follow-up visit [FreeTextEntry1] : Rosario Harrison is a 39yr old female here for a follow up visit after having follow up cerebral angiogram done 1/7/2020. She tolerated the procedure well and remained neurologically intact.  TO review she underwent cerebral angiogram with two pipeline stent placement of a left superior hypophyseal aneurysm 7/9/2019 .Today she feels well. Her prior headaches have now resolved.

## 2020-02-05 NOTE — ASSESSMENT
[FreeTextEntry1] : Impression: 39yr old female s/p endovascular embolization of left superior hypophyseal artery aneurysm with pipeline flow diversion 7/9/2019\par \par Plan: \par Follow up cerebral angiogram from 1/7/2020 showed complete occlusion of the of the aneurysm\par Ok to discontinue plavix \par Remain on aspirin 325mg daily \par MRA brain non con in six months July 2020 then follow up in the office after
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
## 2020-07-21 ENCOUNTER — APPOINTMENT (OUTPATIENT)
Dept: MRI IMAGING | Facility: CLINIC | Age: 40
End: 2020-07-21
Payer: COMMERCIAL

## 2020-07-21 ENCOUNTER — RESULT REVIEW (OUTPATIENT)
Age: 40
End: 2020-07-21

## 2020-07-21 ENCOUNTER — OUTPATIENT (OUTPATIENT)
Dept: OUTPATIENT SERVICES | Facility: HOSPITAL | Age: 40
LOS: 1 days | End: 2020-07-21
Payer: COMMERCIAL

## 2020-07-21 DIAGNOSIS — Z00.8 ENCOUNTER FOR OTHER GENERAL EXAMINATION: ICD-10-CM

## 2020-07-21 DIAGNOSIS — Z98.890 OTHER SPECIFIED POSTPROCEDURAL STATES: Chronic | ICD-10-CM

## 2020-07-21 DIAGNOSIS — Z98.891 HISTORY OF UTERINE SCAR FROM PREVIOUS SURGERY: Chronic | ICD-10-CM

## 2020-07-21 DIAGNOSIS — Z90.89 ACQUIRED ABSENCE OF OTHER ORGANS: Chronic | ICD-10-CM

## 2020-07-21 PROCEDURE — 70544 MR ANGIOGRAPHY HEAD W/O DYE: CPT

## 2020-07-21 PROCEDURE — 70544 MR ANGIOGRAPHY HEAD W/O DYE: CPT | Mod: 26

## 2020-08-13 ENCOUNTER — APPOINTMENT (OUTPATIENT)
Dept: NEUROSURGERY | Facility: CLINIC | Age: 40
End: 2020-08-13
Payer: COMMERCIAL

## 2020-08-13 DIAGNOSIS — I67.1 CEREBRAL ANEURYSM, NONRUPTURED: ICD-10-CM

## 2020-08-13 PROCEDURE — 99213 OFFICE O/P EST LOW 20 MIN: CPT | Mod: 95

## 2020-08-14 PROBLEM — I67.1 ANEURYSM, CEREBRAL: Status: ACTIVE | Noted: 2019-07-24

## 2020-08-14 NOTE — PHYSICAL EXAM
[General Appearance - Alert] : alert [General Appearance - In No Acute Distress] : in no acute distress [Person] : oriented to person [Place] : oriented to place [Time] : oriented to time
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
## 2020-08-14 NOTE — ASSESSMENT
[FreeTextEntry1] : Impression: 40yr old female s/p endovascular embolization of left superior hypophyseal artery aneurysm with pipeline flow diversion 7/9/2019\par \par Plan: \par MRA brain shows no recurrence of the aneurysm \par remain on aspirin 325mg daily \par Final cerebral angiogram January 2021. The risks, benefits, alternatives, complications and personnel associated with the procedure were discussed with the patient and the family in great detail.  They request that we proceed.\par

## 2020-08-14 NOTE — REASON FOR VISIT
[Home] : at home, [unfilled] , at the time of the visit. [Medical Office: ([facility])___] : at the medical office located in  [Verbal consent obtained from patient] : the patient, [unfilled] [FreeTextEntry1] : Rosario Harrison is a 40yr old female here for a follow up visit after having one year follow u MRA brain done.  To review she underwent cerebral angiogram with two pipeline stent placement of a left superior hypophyseal aneurysm 7/9/2019 .Today she feels well, denies any motor sensory speech visual abnormalities.

## 2020-11-10 ENCOUNTER — TRANSCRIPTION ENCOUNTER (OUTPATIENT)
Age: 40
End: 2020-11-10

## 2020-12-17 NOTE — H&P PST ADULT - SURGICAL SITE INCISION
"Requested Prescriptions   Pending Prescriptions Disp Refills     citalopram (CELEXA) 40 MG tablet [Pharmacy Med Name: CITALOPRAM 40MG TABLETS] 30 tablet 1     Sig: TAKE 1 TABLET(40 MG) BY MOUTH DAILY       SSRIs Protocol Passed - 12/15/2020  3:19 AM        Passed - PHQ-9 score less than 5 in past 6 months     Please review last PHQ-9 score.           Passed - Medication is active on med list        Passed - Patient is age 18 or older        Passed - No active pregnancy on record        Passed - No positive pregnancy test in last 12 months        Passed - Recent (6 mo) or future (30 days) visit within the authorizing provider's specialty     Patient had office visit in the last 6 months or has a visit in the next 30 days with authorizing provider or within the authorizing provider's specialty.  See \"Patient Info\" tab in inbasket, or \"Choose Columns\" in Meds & Orders section of the refill encounter.               1 months sent 11/23 -may request when less than 30 days - however patient should follow up with office visit prior to future refills    Refused Prescriptions:                       Disp   Refills    citalopram (CELEXA) 40 MG tablet [Pharmacy*0.1 ta*0        Sig: TAKE 1 TABLET(40 MG) BY MOUTH DAILY  Refused By: MARLO SHEPARD  Reason for Refusal: Patient has requested refill too soon      " no

## 2020-12-30 ENCOUNTER — OUTPATIENT (OUTPATIENT)
Dept: OUTPATIENT SERVICES | Facility: HOSPITAL | Age: 40
LOS: 1 days | End: 2020-12-30
Payer: COMMERCIAL

## 2020-12-30 VITALS
DIASTOLIC BLOOD PRESSURE: 72 MMHG | HEIGHT: 64 IN | WEIGHT: 138.89 LBS | HEART RATE: 67 BPM | SYSTOLIC BLOOD PRESSURE: 108 MMHG | TEMPERATURE: 98 F | OXYGEN SATURATION: 99 % | RESPIRATION RATE: 16 BRPM

## 2020-12-30 DIAGNOSIS — Z01.818 ENCOUNTER FOR OTHER PREPROCEDURAL EXAMINATION: ICD-10-CM

## 2020-12-30 DIAGNOSIS — Z90.89 ACQUIRED ABSENCE OF OTHER ORGANS: Chronic | ICD-10-CM

## 2020-12-30 DIAGNOSIS — Z98.890 OTHER SPECIFIED POSTPROCEDURAL STATES: Chronic | ICD-10-CM

## 2020-12-30 DIAGNOSIS — I67.1 CEREBRAL ANEURYSM, NONRUPTURED: ICD-10-CM

## 2020-12-30 DIAGNOSIS — Z98.890 OTHER SPECIFIED POSTPROCEDURAL STATES: ICD-10-CM

## 2020-12-30 DIAGNOSIS — Z98.891 HISTORY OF UTERINE SCAR FROM PREVIOUS SURGERY: Chronic | ICD-10-CM

## 2020-12-30 LAB
ANION GAP SERPL CALC-SCNC: 12 MMOL/L — SIGNIFICANT CHANGE UP (ref 5–17)
BLD GP AB SCN SERPL QL: NEGATIVE — SIGNIFICANT CHANGE UP
BUN SERPL-MCNC: 14 MG/DL — SIGNIFICANT CHANGE UP (ref 7–23)
CALCIUM SERPL-MCNC: 9.7 MG/DL — SIGNIFICANT CHANGE UP (ref 8.4–10.5)
CHLORIDE SERPL-SCNC: 101 MMOL/L — SIGNIFICANT CHANGE UP (ref 96–108)
CO2 SERPL-SCNC: 24 MMOL/L — SIGNIFICANT CHANGE UP (ref 22–31)
CREAT SERPL-MCNC: 0.64 MG/DL — SIGNIFICANT CHANGE UP (ref 0.5–1.3)
GLUCOSE SERPL-MCNC: 84 MG/DL — SIGNIFICANT CHANGE UP (ref 70–99)
HCT VFR BLD CALC: 39.3 % — SIGNIFICANT CHANGE UP (ref 34.5–45)
HGB BLD-MCNC: 13 G/DL — SIGNIFICANT CHANGE UP (ref 11.5–15.5)
MCHC RBC-ENTMCNC: 31.6 PG — SIGNIFICANT CHANGE UP (ref 27–34)
MCHC RBC-ENTMCNC: 33.1 GM/DL — SIGNIFICANT CHANGE UP (ref 32–36)
MCV RBC AUTO: 95.4 FL — SIGNIFICANT CHANGE UP (ref 80–100)
NRBC # BLD: 0 /100 WBCS — SIGNIFICANT CHANGE UP (ref 0–0)
PLATELET # BLD AUTO: 269 K/UL — SIGNIFICANT CHANGE UP (ref 150–400)
POTASSIUM SERPL-MCNC: 4.3 MMOL/L — SIGNIFICANT CHANGE UP (ref 3.5–5.3)
POTASSIUM SERPL-SCNC: 4.3 MMOL/L — SIGNIFICANT CHANGE UP (ref 3.5–5.3)
RBC # BLD: 4.12 M/UL — SIGNIFICANT CHANGE UP (ref 3.8–5.2)
RBC # FLD: 13.2 % — SIGNIFICANT CHANGE UP (ref 10.3–14.5)
RH IG SCN BLD-IMP: POSITIVE — SIGNIFICANT CHANGE UP
SODIUM SERPL-SCNC: 137 MMOL/L — SIGNIFICANT CHANGE UP (ref 135–145)
WBC # BLD: 5.65 K/UL — SIGNIFICANT CHANGE UP (ref 3.8–10.5)
WBC # FLD AUTO: 5.65 K/UL — SIGNIFICANT CHANGE UP (ref 3.8–10.5)

## 2020-12-30 PROCEDURE — 86901 BLOOD TYPING SEROLOGIC RH(D): CPT

## 2020-12-30 PROCEDURE — 85027 COMPLETE CBC AUTOMATED: CPT

## 2020-12-30 PROCEDURE — 86900 BLOOD TYPING SEROLOGIC ABO: CPT

## 2020-12-30 PROCEDURE — 80048 BASIC METABOLIC PNL TOTAL CA: CPT

## 2020-12-30 PROCEDURE — 86850 RBC ANTIBODY SCREEN: CPT

## 2020-12-30 PROCEDURE — G0463: CPT

## 2020-12-30 NOTE — H&P PST ADULT - NSICDXPASTSURGICALHX_GEN_ALL_CORE_FT
PAST SURGICAL HISTORY:  H/O cerebral aneurysm repair 2019 cerebral angio with 2 pipeline stent placement    H/O cerebral aneurysm repair 2019  s/p follow up angiogram on  2020    H/O  section 7 years ago    H/O ovarian cystectomy  - benign - one ovary and one fallopian tube removed    History of tonsillectomy
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
## 2020-12-30 NOTE — H&P PST ADULT - NSICDXPASTMEDICALHX_GEN_ALL_CORE_FT
PAST MEDICAL HISTORY:  Aneurysm left superior hypophyseal aneurysm, unruptured    COVID-19 virus infection 11/2020 experienced with loss of smell & taste for 2 weeks & its back to normal last week, no respiratory problems i had"    Ovarian cyst benign

## 2020-12-30 NOTE — H&P PST ADULT - NSICDXPROBLEM_GEN_ALL_CORE_FT
PROBLEM DIAGNOSES  Problem: History of cerebral aneurysm repair  Assessment and Plan: cerebral angiogram  Instructed to continue  Asprin &  take with sips of water in AM the day of surgery .  Preop [facility]

## 2021-01-02 ENCOUNTER — OUTPATIENT (OUTPATIENT)
Dept: OUTPATIENT SERVICES | Facility: HOSPITAL | Age: 41
LOS: 1 days | End: 2021-01-02
Payer: COMMERCIAL

## 2021-01-02 DIAGNOSIS — Z98.891 HISTORY OF UTERINE SCAR FROM PREVIOUS SURGERY: Chronic | ICD-10-CM

## 2021-01-02 DIAGNOSIS — Z90.89 ACQUIRED ABSENCE OF OTHER ORGANS: Chronic | ICD-10-CM

## 2021-01-02 DIAGNOSIS — Z98.890 OTHER SPECIFIED POSTPROCEDURAL STATES: Chronic | ICD-10-CM

## 2021-01-02 DIAGNOSIS — Z20.828 CONTACT WITH AND (SUSPECTED) EXPOSURE TO OTHER VIRAL COMMUNICABLE DISEASES: ICD-10-CM

## 2021-01-02 PROCEDURE — C9803: CPT

## 2021-01-02 PROCEDURE — U0005: CPT

## 2021-01-02 PROCEDURE — U0003: CPT

## 2021-01-03 ENCOUNTER — TRANSCRIPTION ENCOUNTER (OUTPATIENT)
Age: 41
End: 2021-01-03

## 2021-01-03 LAB — SARS-COV-2 RNA SPEC QL NAA+PROBE: SIGNIFICANT CHANGE UP

## 2021-01-04 NOTE — H&P PST ADULT - SURGICAL SITE INCISION
[facility] [place] [facility] EMERGENCY DEPT  eMERGENCYdEPARTMENT eNCOUnter      Pt Name: Arlette Del Rio  MRN: [id number]  [place] 1972  Date of evaluation: 1/3/2021  RENETTA Rojas    CHIEF COMPLAINT       Chief Complaint   Patient presents with    Eye Problem     pt states that he is being treated for glaucoma; pain and dizziness         HISTORY OF PRESENT ILLNESS  (Location/Symptom, Timing/Onset, Context/Setting, Quality, Duration, Modifying Factors, Severity.)   Arlette Del Rio is a 46 y.o. male who presents to the emergency department with complaints of right eye pain has been ongoing daily for weeks sees Dr Harpal Vega saw him this past week; local ophthalmology retinal specialist.  Patient admits to right sided temporal pain not prone to migraines he has a history of glaucoma being treated. Not currently taking anything for antibiotics he is a diabetic. Tells me his average pressure this past week is 54 I will assess that tonight. He admits to right visual field blurriness that is been known about since being seen he has a known anterior chamber bleed \"around the iris\". He is here tonight due to pain in eye and pain in head. He has been taking diamox for glaucoma. HPI    Nursing Notes were reviewed and I agree. REVIEW OF SYSTEMS    (2-9 systems for level 4, 10 or more for level 5)     Review of Systems   Constitutional: Negative for activity change, appetite change, chills and fever. HENT: Negative for congestion and dental problem. Eyes: Positive for pain and redness. Negative for photophobia, discharge and itching. Respiratory: Negative for apnea, cough and shortness of breath. Cardiovascular: Negative for chest pain. Musculoskeletal: Negative for arthralgias, back pain, gait problem, myalgias and neck pain. Skin: Negative for color change, pallor and rash. Neurological: Positive for headaches. Negative for dizziness, seizures and syncope. Psychiatric/Behavioral: Negative for agitation.  The patient is not nervous/anxious. Except as noted above the remainder of the review of systems was reviewed and negative. PAST MEDICAL HISTORY     Past Medical History:   Diagnosis Date    Diabetes mellitus ([address]          SURGICAL HISTORY     History reviewed. No pertinent surgical history. CURRENT MEDICATIONS       Discharge Medication List as of 1/3/2021  9:24 PM      CONTINUE these medications which have NOT CHANGED    Details   acetaZOLAMIDE (DIAMOX) 500 MG extended release capsule Take 500 mg by mouth 2 times dailyHistorical Med             ALLERGIES     Penicillins    FAMILY HISTORY     History reviewed. No pertinent family history.        SOCIAL HISTORY       Social History     Socioeconomic History    Marital status:      Spouse name: None    Number of children: None    Years of education: None    Highest education level: None   Occupational History    None   Social Needs    Financial resource strain: None    Food insecurity     Worry: None     Inability: None    Transportation needs     Medical: None     Non-medical: None   Tobacco Use    Smoking status: Never Smoker    Smokeless tobacco: Never Used   Substance and Sexual Activity    Alcohol use: Not Currently    Drug use: Never    Sexual activity: None   Lifestyle    Physical activity     Days per week: None     Minutes per session: None    Stress: None   Relationships    Social connections     Talks on phone: None     Gets together: None     Attends Mosque service: None     Active member of club or organization: None     Attends meetings of clubs or organizations: None     Relationship status: None    Intimate partner violence     Fear of current or ex partner: None     Emotionally abused: None     Physically abused: None     Forced sexual activity: None   Other Topics Concern    None   Social History Narrative    None       SCREENINGS    C[place]y Coma Scale  Eye Opening: Spontaneous  Best Verbal Response: Oriented  Best Motor Response: Obeys commands  C[place]y Coma Scale Score: 15      PHYSICAL EXAM    (up to 7 forlevel 4, 8 or more for level 5)     ED Triage Vitals [01/03/21 1815]   BP Temp Temp Source Pulse Resp SpO2 Height Weight   110/69 97.9 °F (36.6 °C) Temporal 88 21 97 % 6' 2\" (1.88 m) 265 lb (120.2 kg)       Physical Exam  Vitals signs and nursing note reviewed. Constitutional:       General: He is not in acute distress. Appearance: Normal appearance. He is well-developed. He is not diaphoretic. HENT:      Head: Normocephalic and atraumatic. Right Ear: External ear normal.      Left Ear: External ear normal.      Nose: Nose normal.      Mouth/Throat:      Mouth: Mucous membranes are moist.   Eyes:      Extraocular Movements: Extraocular movements intact. Pupils: Pupils are equal, round, and reactive to light. Comments: Patient fundoscopic difficult with anterior chamber bleed. Patient avg pressure 47 today. Patient feeling better with proparacaine   Neck:      Musculoskeletal: Normal range of motion and neck supple. Trachea: No tracheal deviation. Cardiovascular:      Rate and Rhythm: Normal rate and regular rhythm. Heart sounds: Normal heart sounds. No murmur. Pulmonary:      Effort: Pulmonary effort is normal.      Breath sounds: Normal breath sounds. No stridor. No wheezing. Chest:      Chest wall: No tenderness. Abdominal:      General: Bowel sounds are normal. There is no distension. Palpations: Abdomen is soft. Tenderness: There is no abdominal tenderness. Musculoskeletal: Normal range of motion. Skin:     General: Skin is warm and dry. Capillary Refill: Capillary refill takes less than 2 seconds. Neurological:      General: No focal deficit present. Mental Status: He is alert and oriented to person, place, and time. Mental status is at baseline. Cranial Nerves: No cranial nerve deficit. Sensory: No sensory deficit.       Motor: No weakness. Coordination: Coordination normal.      Gait: Gait normal.      Deep Tendon Reflexes: Reflexes normal.   Psychiatric:         Mood and Affect: Mood normal.         Behavior: Behavior normal.         Thought Content: Thought content normal.         Judgment: Judgment normal.           DIAGNOSTIC RESULTS     RADIOLOGY:   Non-plain film images such as CT, Ultrasound and MRI are read by the radiologist. Plain radiographic images are visualized and preliminarilyinterpreted by No att. providers found with the below findings:      Interpretation per the Radiologist below, if available at the time of this note:    CT Head WO Contrast   Final Result   1. No acute intracranial process. Signed by Dr Viet Stout on 1/3/2021 8:06 PM          LABS:  Labs Reviewed   CBC WITH AUTO DIFFERENTIAL - Abnormal; Notable for the following components:       Result Value    Hematocrit 41.2 (*)     Neutrophils % 68.6 (*)     Lymphocytes % 17.2 (*)     Basophils % 1.6 (*)     All other components within normal limits   COMPREHENSIVE METABOLIC PANEL W/ REFLEX TO MG FOR LOW K - Abnormal; Notable for the following components:    Sodium 132 (*)     CO2 15 (*)     Glucose 420 (*)     BUN 27 (*)     CREATININE 1.6 (*)     GFR Non- 46 (*)     GFR  56 (*)     All other components within normal limits   SEDIMENTATION RATE - Abnormal; Notable for the following components:    Sed Rate 31 (*)     All other components within normal limits   C-REACTIVE PROTEIN - Abnormal; Notable for the following components:    CRP 0.71 (*)     All other components within normal limits       All other labs were within normal range or notreturned as of this dictation.     RE-ASSESSMENT        EMERGENCY DEPARTMENT COURSE and DIFFERENTIAL DIAGNOSIS/MDM:   Vitals:    Vitals:    01/03/21 1815 01/03/21 1958 01/03/21 2127   BP: 110/69  (!) 154/76   Pulse: 88 70 78   Resp: 21  20   Temp: 97.9 °F (36.6 °C)  98 °F (36.7 °C) TempSrc: Temporal     SpO2: 97%  98%   Weight: 265 lb (120.2 kg)     Height: 6' 2\" (1.88 m)         MDM  Patient being treated for glaucoma with Diamox. Patient has appointment this week with Dr. Gus Baker. As his pressure is trending down here I have checked multiple times with an average of 47 I feel that that is an encouraging. He has no acute findings in his blood work to supporting type of giant cell arteritis his white count is within normal limits his CT of his head is benign. I have made my attending aware of this patient's work-up it sounds as though a lot of this is chronic unfortunately I would hope that as he takes the medicine and his pressure comes down his ocular pain should decrease as well as reabsorption of blood should occur. He already has his appointment for follow-up made with Dr. Gus Baker which I encouraged him to keep he will call them tomorrow if anything should change acutely I would encourage him to return here promptly. PROCEDURES:    Procedures      FINAL IMPRESSION      1.  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 Radha Powell [phone]    Call         DISCHARGE MEDICATIONS:  Discharge Medication List as of 1/3/2021  9:24 PM          (Please note that portions of this note were completed with a voice recognition program.  Efforts were made to edit the dictations but occasionallywords are mis-transcribed.)    Girma Escamilla [address] [place] [address]  01/04/21 5103 no

## 2021-01-05 ENCOUNTER — RESULT REVIEW (OUTPATIENT)
Age: 41
End: 2021-01-05

## 2021-01-05 ENCOUNTER — OUTPATIENT (OUTPATIENT)
Dept: OUTPATIENT SERVICES | Facility: HOSPITAL | Age: 41
LOS: 1 days | End: 2021-01-05
Payer: COMMERCIAL

## 2021-01-05 ENCOUNTER — APPOINTMENT (OUTPATIENT)
Dept: NEUROSURGERY | Facility: HOSPITAL | Age: 41
End: 2021-01-05
Payer: COMMERCIAL

## 2021-01-05 VITALS
HEART RATE: 94 BPM | SYSTOLIC BLOOD PRESSURE: 103 MMHG | WEIGHT: 134.92 LBS | RESPIRATION RATE: 16 BRPM | DIASTOLIC BLOOD PRESSURE: 72 MMHG | HEIGHT: 64 IN | TEMPERATURE: 98 F | OXYGEN SATURATION: 100 %

## 2021-01-05 VITALS
RESPIRATION RATE: 12 BRPM | SYSTOLIC BLOOD PRESSURE: 106 MMHG | DIASTOLIC BLOOD PRESSURE: 70 MMHG | OXYGEN SATURATION: 97 % | HEART RATE: 60 BPM

## 2021-01-05 DIAGNOSIS — Z90.89 ACQUIRED ABSENCE OF OTHER ORGANS: Chronic | ICD-10-CM

## 2021-01-05 DIAGNOSIS — Z98.891 HISTORY OF UTERINE SCAR FROM PREVIOUS SURGERY: Chronic | ICD-10-CM

## 2021-01-05 DIAGNOSIS — I67.1 CEREBRAL ANEURYSM, NONRUPTURED: ICD-10-CM

## 2021-01-05 DIAGNOSIS — Z98.890 OTHER SPECIFIED POSTPROCEDURAL STATES: Chronic | ICD-10-CM

## 2021-01-05 PROBLEM — U07.1 COVID-19: Chronic | Status: ACTIVE | Noted: 2020-12-30

## 2021-01-05 PROCEDURE — C1894: CPT

## 2021-01-05 PROCEDURE — 36224 PLACE CATH CAROTD ART: CPT | Mod: LT

## 2021-01-05 PROCEDURE — C1769: CPT

## 2021-01-05 PROCEDURE — 36224 PLACE CATH CAROTD ART: CPT

## 2021-01-05 PROCEDURE — C1887: CPT

## 2021-01-05 RX ORDER — SODIUM CHLORIDE 9 MG/ML
1000 INJECTION INTRAMUSCULAR; INTRAVENOUS; SUBCUTANEOUS
Refills: 0 | Status: DISCONTINUED | OUTPATIENT
Start: 2021-01-05 | End: 2021-01-19

## 2021-01-05 RX ORDER — ERENUMAB-AOOE 70 MG/ML
0 INJECTION SUBCUTANEOUS
Qty: 0 | Refills: 0 | DISCHARGE

## 2021-01-05 NOTE — ASU PATIENT PROFILE, ADULT - PSH
H/O cerebral aneurysm repair  2019  s/p follow up angiogram on  2020  H/O cerebral aneurysm repair  2019 cerebral angio with 2 pipeline stent placement  H/O  section  7 years ago  H/O ovarian cystectomy   - benign - one ovary and one fallopian tube removed  History of tonsillectomy

## 2021-01-05 NOTE — CHART NOTE - NSCHARTNOTEFT_GEN_A_CORE
Interventional Neuro- Radiology   Procedure Note PA-BRANDON    Procedure: Selective Cerebral Angiography   Pre- Procedure Diagnosis:  Post- Procedure Diagnosis:    : Dr Donal Trevino  Fellow:     Dr Fabiano Javed   Physician Assistant: Sabrina Farah PA-C    Nurse:  Radiologic Tech:  Anesthesiologist:  Sheath:      I/Os: EBL less than 10cc  IV fluids:     cc Urine output due to void Contrast Omnipaque 240      cc             Vitals: BP         HR      Spo2     %          Preliminary Report:  Using a 5 Yemeni long sheath to the right groin under MAC sedation via left vertebral artery,  left internal carotid artery, left external carotid artery, right vertebral artery, right internal carotid artery, right external carotid artery a selective cerebral angiography was performed and demonstrated                       Official note to follow.  Patient tolerated procedure well, hemodynamically stable, no change in neurological status compared to baseline.  Results discussed with neuro ICU team, patient and patient's family. Right groin sheath was removed, manual compression held to hemostasis for 20 minutes, no active bleeding, no hematoma, quick clot and safeguard balloon dressing applied at Interventional Neuro- Radiology   Procedure Note PA-BRANDON    Procedure: Selective Cerebral Angiography   Pre- Procedure Diagnosis:  Post- Procedure Diagnosis:    : Dr Donal Trevino  Fellow:     Dr Fabiano Javed   Physician Assistant: Sabrina Farah PA-C    Nurse:  Radiologic Tech:  Anesthesiologist:  Sheath:                   5 Guatemalan short sheath       I/Os: EBL less than 10cc  IV fluids:     cc Urine output due to void Contrast Omnipaque 240                 Vitals: BP         HR      Spo2 100%         Preliminary Report:  Using a 5 Guatemalan short sheath to the right groin under MAC sedation via left vertebral artery, left internal carotid artery, left external carotid artery, right vertebral artery, right internal carotid artery, right external carotid artery a selective cerebral angiography was performed and demonstrated                       Official note to follow.  Patient tolerated procedure well, hemodynamically stable, no change in neurological status compared to baseline. Results discussed with patient and patient's family. 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     : Dr Donal Trevino  Fellow:     Dr Fabiano Javed   Physician Assistant: Sabrina Farah PA-C    Nurse:                      Olimpia Brock LRT  Radiologic Tech:       Anesthesiologist:  Sheath:                   5 New Zealander short sheath       I/Os: EBL less than 10cc  IV fluids:     cc Urine output due to void Contrast Omnipaque 240                 Vitals: BP         HR      Spo2 100%         Preliminary Report:  Using a 5 New Zealander short sheath to the right groin under MAC sedation via left vertebral artery, left internal carotid artery, left external carotid artery, right vertebral artery, right internal carotid artery, right external carotid artery a selective cerebral angiography was performed and demonstrated                        Official note to follow.  Patient tolerated procedure well, hemodynamically stable, no change in neurological status compared to baseline. Results discussed with patient and patient's family. 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     : Dr Donal Trevino  Fellow:     Dr Fabiano Javed   Physician Assistant: Sabrina Farah PA-C    Nurse:                       Olimpia Brock LRT  Radiologic Tech:       Anesthesiologist:     Dr Owen Win  Sheath:                   5 Slovak short sheath       I/Os: EBL less than 10cc  IV fluids:     cc Urine output due to void Contrast Omnipaque 240                 Vitals: BP         HR      Spo2 100%         Preliminary Report:  Using a 5 Slovak short sheath to the right groin under MAC sedation via left vertebral artery, left internal carotid artery, left external carotid artery, right vertebral artery, right internal carotid artery, right external carotid artery a selective cerebral angiography was performed and demonstrated                        Official note to follow.  Patient tolerated procedure well, hemodynamically stable, no change in neurological status compared to baseline. Results discussed with patient and patient's family. 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     : Dr Donal Trevino  Fellow:     Dr Fabiano Javed   Physician Assistant: Sabrina Farah PA-C    Nurse:                       Olimpia Brock LRT  Radiologic Tech:       Anesthesiologist:     Dr Owen Win  Sheath:                    5 Armenian short sheath       I/Os: EBL less than 10cc  IV fluids:     cc Urine output due to void Contrast Omnipaque 240                 Vitals: BP         HR      Spo2 100%         Preliminary Report:  Using a 5 Armenian short sheath to the right groin under MAC sedation via left vertebral artery, left internal carotid artery, left external carotid artery, right vertebral artery, right internal carotid artery, right external carotid artery a selective cerebral angiography was performed and demonstrated                        Official note to follow.  Patient tolerated procedure well, hemodynamically stable, no change in neurological status compared to baseline. Results discussed with patient and patient's family. 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     : Dr Donal Trevino  Fellow:     Dr Fabiano Javed   Physician Assistant: Sabrina Farah PA-C    Nurse:                       Olimpia Brock RN  Radiologic Tech:       Christiano Boyd LRT     Anesthesiologist:     Dr Owen Gallardo   Sheath:                    5 Marshallese short sheath       I/Os: EBL less than 10cc  IV fluids: 250cc Urine output due to void Contrast Omnipaque 240 20cc            Vitals: /53        HR 55      Spo2 100%         Preliminary Report:  Using a 5 Marshallese short sheath to the right groin under MAC sedation via left vertebral artery, left internal carotid artery, left external carotid artery, right vertebral artery, right internal carotid artery, right external carotid artery a selective cerebral angiography was performed and demonstrated complete persistent occlusion of aneurysm. Official note to follow.  Patient tolerated procedure well, hemodynamically stable, no change in neurological status compared to baseline. Results discussed with patient and patient's family. Right groin sheath was removed, manual compression held to hemostasis for 20 minutes, no active bleeding, no hematoma, quick clot and safeguard balloon dressing applied at 9:15am. 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     : Dr Donal Trevino  Fellow:     Dr Fabiano Javed   Physician Assistant: Sabrina Farah PA-C    Nurse:                       Olimpia Brock RN  Radiologic Tech:      Christiano Boyd LRT     Anesthesiologist:      Dr Owen Gallardo   Sheath:                    5 Sammarinese short sheath       I/Os: EBL less than 10cc  IV fluids: 250cc Urine output due to void Contrast Omnipaque 240 20cc            Vitals: /53        HR 55      Spo2 100%         Preliminary Report:  Using a 5 Sammarinese short sheath to the right groin under MAC sedation via left vertebral artery, left internal carotid artery, left external carotid artery, right vertebral artery, right internal carotid artery, right external carotid artery a selective cerebral angiography was performed and demonstrated complete persistent occlusion of aneurysm. Official note to follow.  Patient tolerated procedure well, hemodynamically stable, no change in neurological status compared to baseline. Results discussed with patient and patient's family. Right groin sheath was removed, manual compression held to hemostasis for 20 minutes, no active bleeding, no hematoma, quick clot and safeguard balloon dressing applied at 9:15am.

## 2021-01-05 NOTE — CHART NOTE - NSCHARTNOTEFT_GEN_A_CORE
Interventional Neuro Radiology  Pre-Procedure Note PA-C    This is a 40 year old right hand dominant female      HPI:    Allergies: No Known Allergies  PMHX: COVID-19 virus infection 11/2020. Ovarian cyst, left superior hypophyseal aneurysm, unruptured  PSHX: cerebral aneurysm repair, follow up angiogram on  01/07/2020, tonsillectomy, ovarian cystectomy  Social History:   FAMILY HISTORY:  Current Medications:     Labs:                   Blood Bank:       Assessment/Plan:   This is a 40 year old right hand dominant female  presents with   Patient presents to neuro-IR for selective cerebral angiography.   Procedure, goals, risks, benefits and alternatives  were discussed with patient and patient's family.  All questions were answered.  Risks include but are not limited to stroke, vessel injury, hemorrhage, and or right  groin hematoma.  Patient demonstrates understanding  of all risks involved with this procedure and wishes to continue.   Appropriate  content was obtained from patient and consent is in the patient's chart. Interventional Neuro Radiology  Pre-Procedure Note PA-C    This is a 40 year old right hand dominant female with a past medical history significant for a left ophthalmic artery aneurysm, who returns to Neuro IR for a selective cerebral angiography to monitor aneurysm.     Allergies: No Known Allergies  PMHX: COVID-19 virus infection 11/2020. Ovarian cyst, left superior hypophyseal aneurysm, unruptured  PSHX: cerebral aneurysm repair, follow up angiogram on  01/07/2020, tonsillectomy, ovarian cystectomy  Social History: non-tobacco smoker   FAMILY HISTORY: non-contributory   Current Medications: ASA     CBC:          13.0  5.65  39.3  269      BMP:    137   101  14    4.3    24  0.64  84    Blood Bank: A positive available       Assessment/Plan:   This is a 40 year old right hand dominant female with a left ophthalmic artery aneurysm, who returns to Neuro IR for a selective cerebral angiography to monitor aneurysm.   Procedure, goals, risks, benefits and alternatives were discussed with patient. All questions were answered. Risks include but are not limited to stroke, vessel injury, hemorrhage, and or right groin hematoma. Patient demonstrates understanding of all risks involved with this procedure and wishes to continue. Appropriate content was obtained from patient and consent is in the patient's chart.

## 2021-01-05 NOTE — ASU DISCHARGE PLAN (ADULT/PEDIATRIC) - CARE PROVIDER_API CALL
Donal Trevino)  Neurosurgery  [address] [place] [address], [address] [place] [address]  Phone: [phone]  Fax: [phone]  Follow Up Time:

## 2021-01-05 NOTE — ASU DISCHARGE PLAN (ADULT/PEDIATRIC) - NURSING INSTRUCTIONS
Please feel free to contact us at [phone] if any problems arise. After 6PM, Monday through Friday, on weekends and on holidays, please call [phone] and ask for the radiology resident on call to be paged.

## 2021-01-11 DIAGNOSIS — I67.1 CEREBRAL ANEURYSM, NONRUPTURED: ICD-10-CM

## 2021-07-25 NOTE — ED CDU PROVIDER SUBSEQUENT DAY NOTE - PHYSICAL EXAMINATION
None
Gen: WNWD NAD  HEENT: NCAT PERRL EOMI normal pharynx. No frontal or maxillary sinus pressure to palpation. No temporal tenderness to palpation.   Neck: supple  CV: RRR, no murmur  Lung: CTA BL  Abd: +BS soft NTND  Ext: wwp, palp pulses, FROMx4, no cce  Neuro: CN grossly intact, sensation intact, motor 5/5 throughout   Skin: warm, dry, intact. No rashes or lesions.

## 2021-11-16 NOTE — ED ADULT TRIAGE NOTE - TEMPERATURE IN CELSIUS (DEGREES C)
36.6 Excisional Biopsy Additional Text (Leave Blank If You Do Not Want): The margin was drawn around the clinically apparent lesion. An elliptical shape was then drawn on the skin incorporating the lesion and margins.  Incisions were then made along these lines to the appropriate tissue plane and the lesion was extirpated.

## 2022-01-11 NOTE — ED ADULT NURSE REASSESSMENT NOTE - NS ED NURSE REASSESS COMMENT FT1
Onset: Saturday 1/8/22  Location / description: Patient fell down a couple stairs and landed on her left lower back and left elbow. Patient has full range of motion however is sore turning in bed, bending, and getting in/out of the car. Minimal bruising to the area, no redness or open skin.  Pain Scale (1 - 10), 10 highest: 5/10 with bending; described as sore but tolerable  Associated Symptoms: see above. Patient does note she felt it was getting better but feels she irritated it yesterday and has slightly worsened.  What  improves / worsens symptoms: Improves with rest, medication, massage. Worsens with bending or direct pressure to the area.  Symptom specific medications: ibuprofen which is helping. Has not tried ice/heat but will do so. Massage has helped also.  Recent Care: N/A    Offered patient an appointment for further evaluation however she declines. States she is comfortable waiting a few days to see if it improves. Will continue with OTC medication and conservative measures. Educated to call clinic again if symptoms do not improve or worsen. No further questions.   Pt received from GLADIS Bain. Pt oriented to CDU & plan of care was discussed. Pt A&O x 3. Pt in CDU for MRI in am and optho to see. Pt denies any dizziness, or lightheadedness as of now. Pt neuro check intact, no deficits noted. Pt c/o headache 5/10 as of now, medicated as per MAR. Pt resting in bed, Safety & comfort measures maintained. Call bell in reach. Will continue to monitor. Pt received from GLADIS Bain. Pt oriented to CDU & plan of care was discussed. Pt A&O x 3. Pt in CDU for MRI in am, neuro checks q 4 and optho to see. Pt denies any dizziness, or lightheadedness as of now. Pt neuro check intact, no deficits noted. Pt c/o headache 5/10 as of now, medicated as per MAR. Pt resting in bed, Safety & comfort measures maintained. Call bell in reach. Will continue to monitor.

## 2022-07-25 NOTE — ASU DISCHARGE PLAN (ADULT/PEDIATRIC) - ***IN THE EVENT THAT YOU DEVELOP A COMPLICATION AND YOU ARE UNABLE TO REACH YOUR OWN PHYSICIAN, YOU MAY CONTACT:
[employer] Local[employer] scheduled with patient. RN and patient discussed medical history, allergies, and current medication list. Pre-Localization instructions given to patient. Patient denies any further questions at this time. Pt to report to [facility] on 8.1 at 0900 for a 0930 localization. Statement Selected

## 2022-08-31 NOTE — PRE-ANESTHESIA EVALUATION ADULT - NSANTHALCOHOLSD_GEN_ALL_CORE
Patient is a 86y old  Female who presents with a chief complaint of Stroke (18 Aug 2022 13:20)      HPI:  This is a 86 F hx of CAD, Diabetes, Hyperlipemia, Hypertension, Afib on Eliquis presents to ED BIBA from home for slurred speech and right sided facial droop. Patient went to bed that night around ~10PM at baseline. Patient woke up the next morning around ~11Am which is late for her. Daughter noted she was not herself,  checked her sugar and it was ~45. Noted the slurred speech. Also noted patient had trouble swallowing. Patient took her Eliquis dose this morning.  No history of strokes. CT Angio Brain Stroke Protocol  w/ IV Cont, No evidence of major vascular stenosis or occlusion. Scattered mild calcific plaque.  CT perfusion: Apparent perfusion abnormality (penumbra) within the brainstem and left posterior fossa with a total Tmax > 6s volume of 15 CT Brain Stroke Protocol shows no evidence of acute intracranial hemorrhage or large territory infarct. Chronic-appearing left cerebellar infarct (new since 2010). Moderate/severe chronic microvascular changes and parenchymal atrophy (moderately progressed since 2010). < from: MR Head No Cont (08.11.22 @ 17:30) > Motion degraded incomplete examination demonstrating small foci of acute infarct in the left temporal and parietal lobes. Extensive chronic microsphere ischemic changes. Her course was complicated by uncontrolled DM2 an MARCIN on CKD, now medically stable. She was placed on therapeutic Lovenox and then back onto Eliquis. She has been medically stable.     She was seen for a swallow assessment and cleared for Minced and Moist diet with mildly thickened liquids. She was seen by PT and OT and requires max assistance to stand and for bed mobility and min assistance to ambulate 15 ft. with a RW and mod assistance for UE dressing and max assistance for LE dressing. PTA, she was fully independent in all activities, using a straight cane or walker at times. She was evaluated by me on the Neuro Service and was found to be a good candidate for acute inpatient rehab. Her son-in-law at bedside states that she has had some cognitive deficits for a while and that it is near baseline.    TODAY'S SUBJECTIVE & REVIEW OF SYMPTOMS:  Patient was seen and assessed at bedside. No overnight events. Patient has no complaints this morning. Tolerating PT/OT. Tolerating oral diet. Voiding and passing stool spontaneously. Vital signs are stable. Patient is anticipated for discharge today.      Constiutional:    [  x ] WNL           [   ] poor appetite   [   ] insomnia   [   ] tired   Cardio:                [ x  ] WNL           [   ] CP   [   ] SILVERMAN   [   ] palpitations               Resp:                   [ x  ] WNL           [  x ] SOB at times at home  [   ] cough   [   ] wheezing   GI:                        [ x  ] WNL           [   ] constipation   [   ] diarrhea   [   ] abdominal pain   [   ] nausea   [   ] emesis                                :                      [   ] WNL           [   ] REYNA  [   ] dysuria   [   ] difficulty voiding  [ x ] incontinence           Endo:                   [ x  ] WNL          [   ] polyuria   [   ] temperature intolerance                 Skin:                     [ x  ] WNL          [   ] pain   [   ] wound   [   ] rash   MSK:                    [    ] WNL          [   ] muscle pain   [ x  ] joint pain/ stiffness - prior left shoulder injury with contracture   [   ] muscle tenderness   [   ] swelling   Neuro:                 [ x  ] WNL except as per HPI         [   ] HA   [   ] change in vision   [   ] tremor   [   ] weakness   [   ]dysphagia              Cognitive:           [   ] WNL           [ x  ] some impairment PTA    Psych:                  [  x ] WNL           [   ] hallucinations   [   ]agitation   [   ] delusion   [   ]depression    PHYSICAL EXAM    ICU Vital Signs Last 24 Hrs  T(C): 36.4 (31 Aug 2022 05:00), Max: 36.4 (31 Aug 2022 05:00)  T(F): 97.6 (31 Aug 2022 05:00), Max: 97.6 (31 Aug 2022 05:00)  HR: 82 (31 Aug 2022 05:00) (74 - 90)  BP: 143/68 (30 Aug 2022 21:12) (101/61 - 143/68)  BP(mean): --  ABP: --  ABP(mean): --  RR: 19 (31 Aug 2022 05:00) (18 - 19)  SpO2: --    General:[  x ] NAD, Resting Comfortable,   [   ] other:                                HEENT: [  x ] NC/AT, EOMI, PERRL , Normal Conjunctivae,   [   ] other:  Cardio: [  x ] RRR, no murmer,   [   ] other:                              Pulm: [ x  ] No Respiratory Distress,  Lungs CTAB,   [   ] other:                       Abdomen: [  x ]ND/NT, Soft,   [   ] other:    : [  x ] NO REYNA CATHETER, [   ] REYNA CATHETER- no meatal tear, no discharge, [   ] other:                                            MSK: [ x  ] No joint swelling,   [  x ] other:  healed bilat TKR scars, severe limited PROM left shoulder with pain                                    Ext: [  x ]No C/C/E, No calf tenderness,   [   ]other:    Skin: [ x  ]intact,   [   ] other:                                                                   Neurological Examination:  Cognitive: [    ] AAO x 3,   [  x  ]  other: oriented to self and event, not month or year                                                                     Attention:  [ x   ] intact,   [    ]  other:                            Memory: [    ] grossly intact    [  x  ]  other:   impaired  Mood/Affect: [ x   ] wnl,    [    ]  other:                                                                             Communication: [    ]Fluent, no dysarthria, following commands:  [  x  ] other: mild dysarthria. Follows commands/ conversation well. Fluent though word substitution errors  CN II - XII:  [ x   ] intact,  [    ] other:                                                                                        Motor:   RIGHT UE: [  x ] WNL,  [   ] other:  LEFT    UE: [ x  ] WNL,  [   ] other: limited by left shoulder pain/ contracture  RIGHT LE: [  x ] WNL,  [   ] other:   LEFT    LE: [ x  ] WNL,  [   ] other:    Tone: [  x  ] wnl,   [    ]  other:  DTRs: [  x ]symmetric, [   ] other:  Coordination:   [  x  ] intact,   [    ] other:                                                                           Sensory: [ x   ] Intact to light touch,   [    ] other:    Current Level of Function:  Bed Mobility: touch assist-partial assist  Transfers: touch assist  Gait: touch assist 100ft with RW  Lower body dressing: touch assist    MEDICATIONS  (STANDING):  acetaminophen     Tablet .. 650 milliGRAM(s) Oral <User Schedule>  apixaban 5 milliGRAM(s) Oral every 12 hours  atorvastatin 80 milliGRAM(s) Oral at bedtime  dextrose 5%. 1000 milliLiter(s) (50 mL/Hr) IV Continuous <Continuous>  dextrose 5%. 1000 milliLiter(s) (100 mL/Hr) IV Continuous <Continuous>  dextrose 50% Injectable 25 Gram(s) IV Push once  dextrose 50% Injectable 12.5 Gram(s) IV Push once  dextrose 50% Injectable 25 Gram(s) IV Push once  glucagon  Injectable 1 milliGRAM(s) IntraMuscular once  insulin glargine Injectable (LANTUS) 15 Unit(s) SubCutaneous every morning  insulin lispro Injectable (ADMELOG) 5 Unit(s) SubCutaneous three times a day before meals  melatonin 10 milliGRAM(s) Oral at bedtime  metoprolol succinate  milliGRAM(s) Oral daily  nystatin Powder 1 Application(s) Topical every 12 hours    MEDICATIONS  (PRN):  aluminum hydroxide/magnesium hydroxide/simethicone Suspension 30 milliLiter(s) Oral every 4 hours PRN Dyspepsia  dextrose Oral Gel 15 Gram(s) Oral once PRN Blood Glucose LESS THAN 70 milliGRAM(s)/deciliter  magnesium hydroxide Suspension 30 milliLiter(s) Oral daily PRN Constipation  senna 2 Tablet(s) Oral at bedtime PRN Constipation          RECENT LABS/IMAGING        POCT Blood Glucose.: 104 mg/dL (08-31-22 @ 07:13)  POCT Blood Glucose.: 106 mg/dL (08-30-22 @ 16:35)  POCT Blood Glucose.: 151 mg/dL (08-30-22 @ 10:47)  POCT Blood Glucose.: 130 mg/dL (08-30-22 @ 07:10)  POCT Blood Glucose.: 130 mg/dL (08-29-22 @ 16:42)  POCT Blood Glucose.: 143 mg/dL (08-29-22 @ 11:04)  POCT Blood Glucose.: 141 mg/dL (08-29-22 @ 08:23)  POCT Blood Glucose.: 189 mg/dL (08-28-22 @ 16:20)  POCT Blood Glucose.: 115 mg/dL (08-28-22 @ 12:15)  POCT Blood Glucose.: 121 mg/dL (08-28-22 @ 07:52)  POCT Blood Glucose.: 142 mg/dL (08-27-22 @ 16:30)  POCT Blood Glucose.: 153 mg/dL (08-27-22 @ 11:06)   No
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
## 2025-07-21 ENCOUNTER — APPOINTMENT (OUTPATIENT)
Dept: NEUROSURGERY | Facility: CLINIC | Age: 45
End: 2025-07-21